# Patient Record
Sex: MALE | Race: WHITE | NOT HISPANIC OR LATINO | Employment: UNEMPLOYED | URBAN - METROPOLITAN AREA
[De-identification: names, ages, dates, MRNs, and addresses within clinical notes are randomized per-mention and may not be internally consistent; named-entity substitution may affect disease eponyms.]

---

## 2017-02-15 ENCOUNTER — GENERIC CONVERSION - ENCOUNTER (OUTPATIENT)
Dept: OTHER | Facility: OTHER | Age: 57
End: 2017-02-15

## 2017-08-19 ENCOUNTER — APPOINTMENT (EMERGENCY)
Dept: RADIOLOGY | Facility: HOSPITAL | Age: 57
End: 2017-08-19
Payer: MEDICARE

## 2017-08-19 ENCOUNTER — HOSPITAL ENCOUNTER (EMERGENCY)
Facility: HOSPITAL | Age: 57
Discharge: ANOTHER HOSPITAL | End: 2017-08-21
Attending: EMERGENCY MEDICINE | Admitting: EMERGENCY MEDICINE
Payer: MEDICARE

## 2017-08-19 DIAGNOSIS — R45.89 DEPRESSED MOOD: Primary | ICD-10-CM

## 2017-08-19 DIAGNOSIS — R45.851 SUICIDE IDEATION: ICD-10-CM

## 2017-08-19 DIAGNOSIS — Z72.89 ALCOHOL USE: ICD-10-CM

## 2017-08-19 LAB
ANION GAP SERPL CALCULATED.3IONS-SCNC: 13 MMOL/L (ref 4–13)
APAP SERPL-MCNC: <2 UG/ML (ref 10–30)
BASOPHILS # BLD AUTO: 0 THOUSANDS/ΜL (ref 0–0.1)
BASOPHILS NFR BLD AUTO: 1 % (ref 0–1)
BUN SERPL-MCNC: 8 MG/DL (ref 5–25)
CALCIUM SERPL-MCNC: 9.4 MG/DL (ref 8.3–10.1)
CHLORIDE SERPL-SCNC: 103 MMOL/L (ref 100–108)
CO2 SERPL-SCNC: 24 MMOL/L (ref 21–32)
CREAT SERPL-MCNC: 0.68 MG/DL (ref 0.6–1.3)
EOSINOPHIL # BLD AUTO: 0 THOUSAND/ΜL (ref 0–0.61)
EOSINOPHIL NFR BLD AUTO: 1 % (ref 0–6)
ERYTHROCYTE [DISTWIDTH] IN BLOOD BY AUTOMATED COUNT: 13.9 % (ref 11.6–15.1)
ETHANOL SERPL-MCNC: 104 MG/DL (ref 0–3)
ETHANOL SERPL-MCNC: 176 MG/DL (ref 0–3)
GFR SERPL CREATININE-BSD FRML MDRD: 106 ML/MIN/1.73SQ M
GLUCOSE SERPL-MCNC: 94 MG/DL (ref 65–140)
HCT VFR BLD AUTO: 47 % (ref 42–52)
HGB BLD-MCNC: 16.3 G/DL (ref 14–18)
LYMPHOCYTES # BLD AUTO: 1 THOUSANDS/ΜL (ref 0.6–4.47)
LYMPHOCYTES NFR BLD AUTO: 18 % (ref 14–44)
MCH RBC QN AUTO: 33.2 PG (ref 27–31)
MCHC RBC AUTO-ENTMCNC: 34.7 G/DL (ref 31.4–37.4)
MCV RBC AUTO: 96 FL (ref 82–98)
MONOCYTES # BLD AUTO: 0.6 THOUSAND/ΜL (ref 0.17–1.22)
MONOCYTES NFR BLD AUTO: 12 % (ref 4–12)
NEUTROPHILS # BLD AUTO: 3.5 THOUSANDS/ΜL (ref 1.85–7.62)
NEUTS SEG NFR BLD AUTO: 68 % (ref 43–75)
NRBC BLD AUTO-RTO: 0 /100 WBCS
PLATELET # BLD AUTO: 211 THOUSANDS/UL (ref 130–400)
PMV BLD AUTO: 7 FL (ref 8.9–12.7)
POTASSIUM SERPL-SCNC: 3.7 MMOL/L (ref 3.5–5.3)
RBC # BLD AUTO: 4.92 MILLION/UL (ref 4.7–6.1)
SALICYLATES SERPL-MCNC: 3.9 MG/DL (ref 3–20)
SODIUM SERPL-SCNC: 140 MMOL/L (ref 136–145)
WBC # BLD AUTO: 5.2 THOUSAND/UL (ref 4.8–10.8)

## 2017-08-19 PROCEDURE — 80048 BASIC METABOLIC PNL TOTAL CA: CPT | Performed by: EMERGENCY MEDICINE

## 2017-08-19 PROCEDURE — 93005 ELECTROCARDIOGRAM TRACING: CPT | Performed by: EMERGENCY MEDICINE

## 2017-08-19 PROCEDURE — 80320 DRUG SCREEN QUANTALCOHOLS: CPT | Performed by: EMERGENCY MEDICINE

## 2017-08-19 PROCEDURE — 80329 ANALGESICS NON-OPIOID 1 OR 2: CPT | Performed by: EMERGENCY MEDICINE

## 2017-08-19 PROCEDURE — 85025 COMPLETE CBC W/AUTO DIFF WBC: CPT | Performed by: EMERGENCY MEDICINE

## 2017-08-19 PROCEDURE — 36415 COLL VENOUS BLD VENIPUNCTURE: CPT | Performed by: EMERGENCY MEDICINE

## 2017-08-19 PROCEDURE — 70486 CT MAXILLOFACIAL W/O DYE: CPT

## 2017-08-19 PROCEDURE — 70450 CT HEAD/BRAIN W/O DYE: CPT

## 2017-08-19 PROCEDURE — 90715 TDAP VACCINE 7 YRS/> IM: CPT | Performed by: EMERGENCY MEDICINE

## 2017-08-19 RX ORDER — CEPHALEXIN 500 MG/1
500 CAPSULE ORAL ONCE
Status: COMPLETED | OUTPATIENT
Start: 2017-08-19 | End: 2017-08-19

## 2017-08-19 RX ORDER — LIDOCAINE HYDROCHLORIDE 10 MG/ML
5 INJECTION, SOLUTION EPIDURAL; INFILTRATION; INTRACAUDAL; PERINEURAL ONCE
Status: COMPLETED | OUTPATIENT
Start: 2017-08-19 | End: 2017-08-19

## 2017-08-19 RX ADMIN — LIDOCAINE HYDROCHLORIDE 5 ML: 10 INJECTION, SOLUTION EPIDURAL; INFILTRATION; INTRACAUDAL; PERINEURAL at 17:55

## 2017-08-19 RX ADMIN — TETANUS TOXOID, REDUCED DIPHTHERIA TOXOID AND ACELLULAR PERTUSSIS VACCINE, ADSORBED 0.5 ML: 5; 2.5; 8; 8; 2.5 SUSPENSION INTRAMUSCULAR at 17:54

## 2017-08-19 RX ADMIN — CEPHALEXIN 500 MG: 500 CAPSULE ORAL at 19:36

## 2017-08-20 LAB
AMPHETAMINES SERPL QL SCN: NEGATIVE
BARBITURATES UR QL: NEGATIVE
BENZODIAZ UR QL: NEGATIVE
BILIRUB UR QL STRIP: NEGATIVE
CLARITY UR: CLEAR
COCAINE UR QL: POSITIVE
COLOR UR: YELLOW
GLUCOSE UR STRIP-MCNC: NEGATIVE MG/DL
HGB UR QL STRIP.AUTO: NEGATIVE
KETONES UR STRIP-MCNC: NEGATIVE MG/DL
LEUKOCYTE ESTERASE UR QL STRIP: NEGATIVE
METHADONE UR QL: NEGATIVE
NITRITE UR QL STRIP: NEGATIVE
OPIATES UR QL SCN: NEGATIVE
PCP UR QL: NEGATIVE
PH UR STRIP.AUTO: 7 [PH] (ref 5–9)
PROT UR STRIP-MCNC: NEGATIVE MG/DL
SP GR UR STRIP.AUTO: <=1.005 (ref 1–1.03)
THC UR QL: NEGATIVE
UROBILINOGEN UR QL STRIP.AUTO: 0.2 E.U./DL

## 2017-08-20 PROCEDURE — 80307 DRUG TEST PRSMV CHEM ANLYZR: CPT | Performed by: EMERGENCY MEDICINE

## 2017-08-20 PROCEDURE — 81003 URINALYSIS AUTO W/O SCOPE: CPT | Performed by: EMERGENCY MEDICINE

## 2017-08-20 RX ORDER — NICOTINE 21 MG/24HR
21 PATCH, TRANSDERMAL 24 HOURS TRANSDERMAL DAILY
Status: DISCONTINUED | OUTPATIENT
Start: 2017-08-20 | End: 2017-08-21 | Stop reason: HOSPADM

## 2017-08-20 RX ORDER — NICOTINE 21 MG/24HR
PATCH, TRANSDERMAL 24 HOURS TRANSDERMAL
Status: DISPENSED
Start: 2017-08-20 | End: 2017-08-21

## 2017-08-20 RX ORDER — CEPHALEXIN 500 MG/1
500 CAPSULE ORAL 2 TIMES DAILY
Status: DISCONTINUED | OUTPATIENT
Start: 2017-08-20 | End: 2017-08-21 | Stop reason: HOSPADM

## 2017-08-20 RX ADMIN — CEPHALEXIN 500 MG: 500 CAPSULE ORAL at 19:17

## 2017-08-20 RX ADMIN — NICOTINE 21 MG: 21 PATCH, EXTENDED RELEASE TRANSDERMAL at 20:14

## 2017-08-21 VITALS
DIASTOLIC BLOOD PRESSURE: 77 MMHG | WEIGHT: 130 LBS | RESPIRATION RATE: 18 BRPM | HEIGHT: 64 IN | BODY MASS INDEX: 22.2 KG/M2 | SYSTOLIC BLOOD PRESSURE: 139 MMHG | HEART RATE: 69 BPM | OXYGEN SATURATION: 97 % | TEMPERATURE: 97 F

## 2017-08-21 LAB
ATRIAL RATE: 78 BPM
P AXIS: 67 DEGREES
PR INTERVAL: 138 MS
QRS AXIS: 76 DEGREES
QRSD INTERVAL: 98 MS
QT INTERVAL: 396 MS
QTC INTERVAL: 451 MS
T WAVE AXIS: 69 DEGREES
VENTRICULAR RATE: 78 BPM

## 2017-08-21 PROCEDURE — 99285 EMERGENCY DEPT VISIT HI MDM: CPT

## 2017-08-21 PROCEDURE — 90471 IMMUNIZATION ADMIN: CPT

## 2017-08-21 RX ADMIN — CEPHALEXIN 500 MG: 500 CAPSULE ORAL at 09:46

## 2017-08-21 RX ADMIN — NICOTINE 21 MG: 21 PATCH, EXTENDED RELEASE TRANSDERMAL at 09:52

## 2017-08-21 RX ADMIN — CEPHALEXIN 500 MG: 500 CAPSULE ORAL at 20:07

## 2017-08-28 ENCOUNTER — GENERIC CONVERSION - ENCOUNTER (OUTPATIENT)
Dept: OTHER | Facility: OTHER | Age: 57
End: 2017-08-28

## 2017-08-29 ENCOUNTER — ALLSCRIPTS OFFICE VISIT (OUTPATIENT)
Dept: PSYCHOLOGY | Facility: CLINIC | Age: 57
End: 2017-08-29
Payer: MEDICARE

## 2017-08-29 ENCOUNTER — GENERIC CONVERSION - ENCOUNTER (OUTPATIENT)
Dept: OTHER | Facility: OTHER | Age: 57
End: 2017-08-29

## 2017-08-29 PROCEDURE — G0177 OPPS/PHP; TRAIN & EDUC SERV: HCPCS | Performed by: PSYCHIATRY & NEUROLOGY

## 2017-08-29 PROCEDURE — 90791 PSYCH DIAGNOSTIC EVALUATION: CPT | Performed by: PSYCHIATRY & NEUROLOGY

## 2017-08-29 PROCEDURE — G0176 OPPS/PHP;ACTIVITY THERAPY: HCPCS | Performed by: PSYCHIATRY & NEUROLOGY

## 2017-08-29 PROCEDURE — G0410 GRP PSYCH PARTIAL HOSP 45-50: HCPCS | Performed by: PSYCHIATRY & NEUROLOGY

## 2017-08-30 ENCOUNTER — APPOINTMENT (OUTPATIENT)
Dept: PSYCHOLOGY | Facility: CLINIC | Age: 57
End: 2017-08-30
Payer: MEDICARE

## 2017-08-30 ENCOUNTER — GENERIC CONVERSION - ENCOUNTER (OUTPATIENT)
Dept: OTHER | Facility: OTHER | Age: 57
End: 2017-08-30

## 2017-08-30 PROCEDURE — G0177 OPPS/PHP; TRAIN & EDUC SERV: HCPCS | Performed by: PSYCHIATRY & NEUROLOGY

## 2017-08-30 PROCEDURE — G0176 OPPS/PHP;ACTIVITY THERAPY: HCPCS | Performed by: PSYCHIATRY & NEUROLOGY

## 2017-08-30 PROCEDURE — G0410 GRP PSYCH PARTIAL HOSP 45-50: HCPCS | Performed by: PSYCHIATRY & NEUROLOGY

## 2017-08-31 ENCOUNTER — APPOINTMENT (OUTPATIENT)
Dept: PSYCHOLOGY | Facility: CLINIC | Age: 57
End: 2017-08-31
Payer: MEDICARE

## 2017-08-31 ENCOUNTER — GENERIC CONVERSION - ENCOUNTER (OUTPATIENT)
Dept: OTHER | Facility: OTHER | Age: 57
End: 2017-08-31

## 2017-08-31 PROCEDURE — G0177 OPPS/PHP; TRAIN & EDUC SERV: HCPCS | Performed by: PSYCHIATRY & NEUROLOGY

## 2017-08-31 PROCEDURE — G0176 OPPS/PHP;ACTIVITY THERAPY: HCPCS | Performed by: PSYCHIATRY & NEUROLOGY

## 2017-08-31 PROCEDURE — G0410 GRP PSYCH PARTIAL HOSP 45-50: HCPCS | Performed by: PSYCHIATRY & NEUROLOGY

## 2017-09-01 ENCOUNTER — APPOINTMENT (OUTPATIENT)
Dept: PSYCHOLOGY | Facility: CLINIC | Age: 57
End: 2017-09-01
Payer: MEDICARE

## 2017-09-01 ENCOUNTER — GENERIC CONVERSION - ENCOUNTER (OUTPATIENT)
Dept: OTHER | Facility: OTHER | Age: 57
End: 2017-09-01

## 2017-09-01 PROCEDURE — G0176 OPPS/PHP;ACTIVITY THERAPY: HCPCS | Performed by: PSYCHIATRY & NEUROLOGY

## 2017-09-01 PROCEDURE — G0177 OPPS/PHP; TRAIN & EDUC SERV: HCPCS | Performed by: PSYCHIATRY & NEUROLOGY

## 2017-09-01 PROCEDURE — G0410 GRP PSYCH PARTIAL HOSP 45-50: HCPCS | Performed by: PSYCHIATRY & NEUROLOGY

## 2017-09-05 ENCOUNTER — GENERIC CONVERSION - ENCOUNTER (OUTPATIENT)
Dept: OTHER | Facility: OTHER | Age: 57
End: 2017-09-05

## 2017-09-05 ENCOUNTER — APPOINTMENT (OUTPATIENT)
Dept: PSYCHOLOGY | Facility: CLINIC | Age: 57
End: 2017-09-05
Payer: MEDICARE

## 2017-09-05 PROCEDURE — G0176 OPPS/PHP;ACTIVITY THERAPY: HCPCS | Performed by: PSYCHIATRY & NEUROLOGY

## 2017-09-05 PROCEDURE — G0177 OPPS/PHP; TRAIN & EDUC SERV: HCPCS | Performed by: PSYCHIATRY & NEUROLOGY

## 2017-09-05 PROCEDURE — G0410 GRP PSYCH PARTIAL HOSP 45-50: HCPCS | Performed by: PSYCHIATRY & NEUROLOGY

## 2017-09-06 ENCOUNTER — APPOINTMENT (OUTPATIENT)
Dept: PSYCHOLOGY | Facility: CLINIC | Age: 57
End: 2017-09-06
Payer: MEDICARE

## 2017-09-06 ENCOUNTER — GENERIC CONVERSION - ENCOUNTER (OUTPATIENT)
Dept: OTHER | Facility: OTHER | Age: 57
End: 2017-09-06

## 2017-09-06 PROCEDURE — G0177 OPPS/PHP; TRAIN & EDUC SERV: HCPCS | Performed by: PSYCHIATRY & NEUROLOGY

## 2017-09-06 PROCEDURE — G0410 GRP PSYCH PARTIAL HOSP 45-50: HCPCS | Performed by: PSYCHIATRY & NEUROLOGY

## 2017-09-06 PROCEDURE — G0176 OPPS/PHP;ACTIVITY THERAPY: HCPCS | Performed by: PSYCHIATRY & NEUROLOGY

## 2017-09-07 ENCOUNTER — GENERIC CONVERSION - ENCOUNTER (OUTPATIENT)
Dept: OTHER | Facility: OTHER | Age: 57
End: 2017-09-07

## 2017-09-07 ENCOUNTER — APPOINTMENT (OUTPATIENT)
Dept: PSYCHOLOGY | Facility: CLINIC | Age: 57
End: 2017-09-07
Payer: MEDICARE

## 2017-09-07 PROCEDURE — G0410 GRP PSYCH PARTIAL HOSP 45-50: HCPCS | Performed by: PSYCHIATRY & NEUROLOGY

## 2017-09-07 PROCEDURE — G0176 OPPS/PHP;ACTIVITY THERAPY: HCPCS | Performed by: PSYCHIATRY & NEUROLOGY

## 2017-09-07 PROCEDURE — G0177 OPPS/PHP; TRAIN & EDUC SERV: HCPCS | Performed by: PSYCHIATRY & NEUROLOGY

## 2017-09-08 ENCOUNTER — GENERIC CONVERSION - ENCOUNTER (OUTPATIENT)
Dept: OTHER | Facility: OTHER | Age: 57
End: 2017-09-08

## 2017-09-11 ENCOUNTER — GENERIC CONVERSION - ENCOUNTER (OUTPATIENT)
Dept: OTHER | Facility: OTHER | Age: 57
End: 2017-09-11

## 2017-09-12 ENCOUNTER — GENERIC CONVERSION - ENCOUNTER (OUTPATIENT)
Dept: OTHER | Facility: OTHER | Age: 57
End: 2017-09-12

## 2017-09-12 ENCOUNTER — APPOINTMENT (OUTPATIENT)
Dept: PSYCHOLOGY | Facility: CLINIC | Age: 57
End: 2017-09-12
Payer: MEDICARE

## 2017-09-12 PROCEDURE — G0410 GRP PSYCH PARTIAL HOSP 45-50: HCPCS | Performed by: PSYCHIATRY & NEUROLOGY

## 2017-09-12 PROCEDURE — G0176 OPPS/PHP;ACTIVITY THERAPY: HCPCS | Performed by: PSYCHIATRY & NEUROLOGY

## 2017-09-12 PROCEDURE — G0177 OPPS/PHP; TRAIN & EDUC SERV: HCPCS | Performed by: PSYCHIATRY & NEUROLOGY

## 2017-09-13 ENCOUNTER — GENERIC CONVERSION - ENCOUNTER (OUTPATIENT)
Dept: OTHER | Facility: OTHER | Age: 57
End: 2017-09-13

## 2017-09-13 ENCOUNTER — APPOINTMENT (OUTPATIENT)
Dept: PSYCHOLOGY | Facility: CLINIC | Age: 57
End: 2017-09-13
Payer: MEDICARE

## 2017-09-13 PROCEDURE — G0177 OPPS/PHP; TRAIN & EDUC SERV: HCPCS | Performed by: PSYCHIATRY & NEUROLOGY

## 2017-09-13 PROCEDURE — G0410 GRP PSYCH PARTIAL HOSP 45-50: HCPCS | Performed by: PSYCHIATRY & NEUROLOGY

## 2017-09-13 PROCEDURE — G0176 OPPS/PHP;ACTIVITY THERAPY: HCPCS | Performed by: PSYCHIATRY & NEUROLOGY

## 2017-09-14 ENCOUNTER — GENERIC CONVERSION - ENCOUNTER (OUTPATIENT)
Dept: OTHER | Facility: OTHER | Age: 57
End: 2017-09-14

## 2017-09-14 ENCOUNTER — APPOINTMENT (OUTPATIENT)
Dept: PSYCHOLOGY | Facility: CLINIC | Age: 57
End: 2017-09-14
Payer: MEDICARE

## 2017-09-14 PROCEDURE — G0176 OPPS/PHP;ACTIVITY THERAPY: HCPCS | Performed by: PSYCHIATRY & NEUROLOGY

## 2017-09-14 PROCEDURE — G0177 OPPS/PHP; TRAIN & EDUC SERV: HCPCS | Performed by: PSYCHIATRY & NEUROLOGY

## 2017-09-14 PROCEDURE — G0410 GRP PSYCH PARTIAL HOSP 45-50: HCPCS | Performed by: PSYCHIATRY & NEUROLOGY

## 2017-09-15 ENCOUNTER — GENERIC CONVERSION - ENCOUNTER (OUTPATIENT)
Dept: OTHER | Facility: OTHER | Age: 57
End: 2017-09-15

## 2017-09-15 ENCOUNTER — APPOINTMENT (OUTPATIENT)
Dept: PSYCHOLOGY | Facility: CLINIC | Age: 57
End: 2017-09-15
Payer: MEDICARE

## 2017-09-15 PROCEDURE — G0410 GRP PSYCH PARTIAL HOSP 45-50: HCPCS | Performed by: PSYCHIATRY & NEUROLOGY

## 2017-09-15 PROCEDURE — G0177 OPPS/PHP; TRAIN & EDUC SERV: HCPCS | Performed by: PSYCHIATRY & NEUROLOGY

## 2017-09-15 PROCEDURE — G0176 OPPS/PHP;ACTIVITY THERAPY: HCPCS | Performed by: PSYCHIATRY & NEUROLOGY

## 2017-09-18 ENCOUNTER — APPOINTMENT (OUTPATIENT)
Dept: PSYCHOLOGY | Facility: CLINIC | Age: 57
End: 2017-09-18
Payer: MEDICARE

## 2017-09-18 ENCOUNTER — GENERIC CONVERSION - ENCOUNTER (OUTPATIENT)
Dept: OTHER | Facility: OTHER | Age: 57
End: 2017-09-18

## 2017-09-18 PROCEDURE — G0177 OPPS/PHP; TRAIN & EDUC SERV: HCPCS | Performed by: PSYCHIATRY & NEUROLOGY

## 2017-09-18 PROCEDURE — G0176 OPPS/PHP;ACTIVITY THERAPY: HCPCS | Performed by: PSYCHIATRY & NEUROLOGY

## 2017-09-18 PROCEDURE — G0410 GRP PSYCH PARTIAL HOSP 45-50: HCPCS | Performed by: PSYCHIATRY & NEUROLOGY

## 2017-09-19 ENCOUNTER — APPOINTMENT (OUTPATIENT)
Dept: PSYCHOLOGY | Facility: CLINIC | Age: 57
End: 2017-09-19
Payer: MEDICARE

## 2017-09-19 ENCOUNTER — GENERIC CONVERSION - ENCOUNTER (OUTPATIENT)
Dept: OTHER | Facility: OTHER | Age: 57
End: 2017-09-19

## 2017-09-19 DIAGNOSIS — Z79.899 OTHER LONG TERM (CURRENT) DRUG THERAPY: ICD-10-CM

## 2017-09-19 PROCEDURE — G0177 OPPS/PHP; TRAIN & EDUC SERV: HCPCS | Performed by: PSYCHIATRY & NEUROLOGY

## 2017-09-19 PROCEDURE — G0176 OPPS/PHP;ACTIVITY THERAPY: HCPCS | Performed by: PSYCHIATRY & NEUROLOGY

## 2017-09-19 PROCEDURE — G0410 GRP PSYCH PARTIAL HOSP 45-50: HCPCS | Performed by: PSYCHIATRY & NEUROLOGY

## 2017-09-20 ENCOUNTER — GENERIC CONVERSION - ENCOUNTER (OUTPATIENT)
Dept: OTHER | Facility: OTHER | Age: 57
End: 2017-09-20

## 2017-09-21 ENCOUNTER — GENERIC CONVERSION - ENCOUNTER (OUTPATIENT)
Dept: OTHER | Facility: OTHER | Age: 57
End: 2017-09-21

## 2017-09-21 ENCOUNTER — APPOINTMENT (OUTPATIENT)
Dept: LAB | Facility: CLINIC | Age: 57
End: 2017-09-21
Payer: MEDICARE

## 2017-09-21 ENCOUNTER — APPOINTMENT (OUTPATIENT)
Dept: PSYCHOLOGY | Facility: CLINIC | Age: 57
End: 2017-09-21
Payer: MEDICARE

## 2017-09-21 DIAGNOSIS — Z79.899 OTHER LONG TERM (CURRENT) DRUG THERAPY: ICD-10-CM

## 2017-09-21 LAB
ALBUMIN SERPL BCP-MCNC: 3.2 G/DL (ref 3.5–5)
ALP SERPL-CCNC: 85 U/L (ref 46–116)
ALT SERPL W P-5'-P-CCNC: 23 U/L (ref 12–78)
ANION GAP SERPL CALCULATED.3IONS-SCNC: 6 MMOL/L (ref 4–13)
AST SERPL W P-5'-P-CCNC: 16 U/L (ref 5–45)
BASOPHILS # BLD AUTO: 0.03 THOUSANDS/ΜL (ref 0–0.1)
BASOPHILS NFR BLD AUTO: 1 % (ref 0–1)
BILIRUB SERPL-MCNC: 0.46 MG/DL (ref 0.2–1)
BUN SERPL-MCNC: 17 MG/DL (ref 5–25)
CALCIUM SERPL-MCNC: 8.5 MG/DL (ref 8.3–10.1)
CHLORIDE SERPL-SCNC: 108 MMOL/L (ref 100–108)
CO2 SERPL-SCNC: 25 MMOL/L (ref 21–32)
CREAT SERPL-MCNC: 0.68 MG/DL (ref 0.6–1.3)
EOSINOPHIL # BLD AUTO: 0.13 THOUSAND/ΜL (ref 0–0.61)
EOSINOPHIL NFR BLD AUTO: 2 % (ref 0–6)
ERYTHROCYTE [DISTWIDTH] IN BLOOD BY AUTOMATED COUNT: 13.4 % (ref 11.6–15.1)
GFR SERPL CREATININE-BSD FRML MDRD: 106 ML/MIN/1.73SQ M
GLUCOSE P FAST SERPL-MCNC: 104 MG/DL (ref 65–99)
HCT VFR BLD AUTO: 45.1 % (ref 36.5–49.3)
HGB BLD-MCNC: 15.8 G/DL (ref 12–17)
LYMPHOCYTES # BLD AUTO: 0.97 THOUSANDS/ΜL (ref 0.6–4.47)
LYMPHOCYTES NFR BLD AUTO: 17 % (ref 14–44)
MCH RBC QN AUTO: 33 PG (ref 26.8–34.3)
MCHC RBC AUTO-ENTMCNC: 35 G/DL (ref 31.4–37.4)
MCV RBC AUTO: 94 FL (ref 82–98)
MONOCYTES # BLD AUTO: 0.86 THOUSAND/ΜL (ref 0.17–1.22)
MONOCYTES NFR BLD AUTO: 15 % (ref 4–12)
NEUTROPHILS # BLD AUTO: 3.58 THOUSANDS/ΜL (ref 1.85–7.62)
NEUTS SEG NFR BLD AUTO: 65 % (ref 43–75)
NRBC BLD AUTO-RTO: 0 /100 WBCS
PLATELET # BLD AUTO: 160 THOUSANDS/UL (ref 149–390)
PMV BLD AUTO: 9.9 FL (ref 8.9–12.7)
POTASSIUM SERPL-SCNC: 4.1 MMOL/L (ref 3.5–5.3)
PROT SERPL-MCNC: 6.6 G/DL (ref 6.4–8.2)
RBC # BLD AUTO: 4.79 MILLION/UL (ref 3.88–5.62)
SODIUM SERPL-SCNC: 139 MMOL/L (ref 136–145)
VALPROATE SERPL-MCNC: 71 UG/ML (ref 50–100)
WBC # BLD AUTO: 5.58 THOUSAND/UL (ref 4.31–10.16)

## 2017-09-21 PROCEDURE — 36415 COLL VENOUS BLD VENIPUNCTURE: CPT

## 2017-09-21 PROCEDURE — G0177 OPPS/PHP; TRAIN & EDUC SERV: HCPCS | Performed by: PSYCHIATRY & NEUROLOGY

## 2017-09-21 PROCEDURE — 85025 COMPLETE CBC W/AUTO DIFF WBC: CPT

## 2017-09-21 PROCEDURE — 80164 ASSAY DIPROPYLACETIC ACD TOT: CPT

## 2017-09-21 PROCEDURE — G0176 OPPS/PHP;ACTIVITY THERAPY: HCPCS | Performed by: PSYCHIATRY & NEUROLOGY

## 2017-09-21 PROCEDURE — G0410 GRP PSYCH PARTIAL HOSP 45-50: HCPCS | Performed by: PSYCHIATRY & NEUROLOGY

## 2017-09-21 PROCEDURE — 80053 COMPREHEN METABOLIC PANEL: CPT

## 2017-09-22 ENCOUNTER — GENERIC CONVERSION - ENCOUNTER (OUTPATIENT)
Dept: OTHER | Facility: OTHER | Age: 57
End: 2017-09-22

## 2017-09-22 ENCOUNTER — APPOINTMENT (OUTPATIENT)
Dept: PSYCHOLOGY | Facility: CLINIC | Age: 57
End: 2017-09-22
Payer: MEDICARE

## 2017-09-22 PROCEDURE — G0176 OPPS/PHP;ACTIVITY THERAPY: HCPCS | Performed by: PSYCHIATRY & NEUROLOGY

## 2017-09-22 PROCEDURE — G0410 GRP PSYCH PARTIAL HOSP 45-50: HCPCS | Performed by: PSYCHIATRY & NEUROLOGY

## 2017-09-22 PROCEDURE — G0177 OPPS/PHP; TRAIN & EDUC SERV: HCPCS | Performed by: PSYCHIATRY & NEUROLOGY

## 2017-11-02 ENCOUNTER — GENERIC CONVERSION - ENCOUNTER (OUTPATIENT)
Dept: OTHER | Facility: OTHER | Age: 57
End: 2017-11-02
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
## 2018-01-10 NOTE — PSYCH
History of Present Illness  [facility] Clinical Progress Note [facility]:   Specialized Services Documentation - Therapist must complete separate progress note for each specific clinical activity in which the client participated during the day  (129) Group Psychotherapy: [id number] Kendall Sever participated in wellness group focused on identifying one's strengths and understanding how they can be applied to improving wellness  Kendall Sever shared that one important strength he possesses is that he is organized and that "everything has a place and everything needs to go to its place " Kendall Sever stated that he also handles his medications in this way and therefore he has no trouble taking them as they are ordered on a regular routine  He stated that he is looking for a new residence and he discussed with peers how he can apply his strength of organizing and time management to his search for a new residence  Kendall Sever made mild progress toward goals  Continue group to provide education and practice in identifying one's own strengths and ways to apply these strengths to improving symptoms of mental illness and better wellness  Treatment Plan Problem(s): 1 1,1 2,1 3  Natalie Arshad RN     (978) Group Psychotherapy: (12:30-1:30pm)   Kendall Sever participated in group  The group topics were 15 styles of distorted thinking and 12 rules to ruin your day  The group reviewed the handouts then discussed which of the distorted styles of thinking do they have, where does having distorted thinking lead to and coping skills  He was able to identify his distorted thinking styles  He participated only when asked to do so  Min progress on goal  Continue with group  Treatment Plan Problem(s): 1 2  Susanne Kilpatrick LCSW       (165) Education Therapy Goals set - Speak at [facility] Problem(s): 1 1, 1 5  Education Therapy Time - 0900 - 0930 Previous goal was met  Readiness to Learning:  He is receptive to learning     There are  no barriers to learning  Learning Assessment Time - 1330 - 1400   Education completed on  illness and wellness tools  The teaching method was  verbal and demonstration  JESSENIA Alvarez MT-BC     (848) Allied Therapy [phone] Huber Antoine moderately shared in [facility] group focused on assertive behavior and DBT module of Interpersonal Effectiveness  He partially engaged in task of instrument playing and discussion  Group explored the differences between assertive, passive, passive aggressive, and aggressive  He received hand-outs on goals in interpersonal effectiveness and challenging myths  Hubercoral Antoine engaged in instrument playing but remained quiet during discussion  Some small progress towards goal noted  Continue AT to further encourage the use of wellness tool to promote recovery  JESSENIA Alvarez  Treatment Plan Problem(s): 1 1, 1 2  TONY Tanner       Case Management Note:   15:45-call to [facility], left message for Cindi Parsons,  ([phone]) for call back re: intake into [facility] office  14:57-left message for Levy Nicholson single occupancy  for CORBIN Waller, requesting call back re: program availability and requirements  TREATMENT SESSION NUMBER: 10   Current suicide risk is low  Medications not changed/added/denied  Soma Water MSW, LSW      Active Problems    1  Alcohol abuse (305 00) (F10 10)   2  Bipolar disorder, current episode depressed, severe, without psychotic features (296 53)   (F31 4)   3  Chronic obstructive pulmonary disease (496) (J44 9)   4  Complete tear of left rotator cuff (727 61) (M75 122)   5  Complete tear of right rotator cuff (727 61) (M75 121)   6  Hypercholesterolemia (272 0) (E78 00)   7  Hypothyroidism (244 9) (E03 9)   8  Malignant neoplasm without specification of site (199 1) (C80 1)   9  Rotator cuff tendinitis, unspecified laterality (726 10) (M75 80)   10   Shoulder tendonitis, right (786 94) (A89 62)    Past Medical History    1  Denied: History of seizure   2  Denied: History of traumatic injury of head    Allergies    1  No Known Drug Allergies    Current Meds   1  HydrOXYzine Pamoate 50 MG Oral Capsule; TAKE 1 CAPSULE Every 4 hours PRN; Therapy: (Recorded:10Jba2956) to Recorded   2  Levothyroxine Sodium 25 MCG Oral Tablet; TAKE 1 TABLET DAILY; Therapy: (Ermelinda Macedo) to Recorded   3  Mirtazapine 15 MG Oral Tablet; TAKE 1 TABLET Bedtime; Therapy: (Ermelinda Macedo) to Recorded   4  Omega 3 1000 MG Oral Capsule; Take 1 capsule twice daily; Therapy: [id number] to (Evaluate:28Sep2017)  Requested for: 90Txm5581; Last   Rx:22Cud4950 Ordered   5  Valproate Sodium 250 MG/5ML Oral Solution; TAKE 10ML TWICE DAILY; Therapy: 07Sep2017 to (Last Rx:07Sep2017) Ordered   6  Wellbutrin 75 MG TABS; Take 1 tablet daily; Therapy: (Recorded:50Jqh0345) to Recorded    Family Psych History  Mother    1  Family history of Alcohol abuse   2  Family history of depression (V17 0) (Z81 8)  Father    3  Family history of Alcohol abuse   4  Family history of depression (V17 0) (Z81 8)  Sister    5  Family history of Alcohol abuse  Family History    6   Family history of No Significant Family History    Social History    · Alcohol abuse (305 00) (F10 10)   · Completed 8th grade   · Current Every Day Smoker (305 1)   · Daily Coffee Consumption (___ Cups/Day)   · Disabled   ·     Future Appointments    Date/Time Provider Specialty Site   09/15/2017 10:00 AM Matilda Sherman DO Psychiatry [facility]     Signatures   Electronically signed by : Geovanny Hernandez RN; Sep 14 2017  2:07PM EST                       (Author)    Electronically signed by : Irish Acharya LCSW; Sep 14 2017  2:09PM EST                       (Author)    Electronically signed by : Irish Acharya LCSW; Sep 14 2017  2:13PM EST                       (Author)    Electronically signed by : JESSENIA Chaparro; Sep 14 2017  2:15PM EST                       (Author)    Electronically signed by : TONY Hernandes; Sep 14 2017  2:31PM EST                       (Author)    Electronically signed by : KIRSTEN Guthrie; Sep 14 2017  3:59PM EST                       (Author)

## 2018-01-10 NOTE — PSYCH
History of Present Illness  [facility] Clinical Progress Note [facility]:   Specialized Services Documentation - Therapist must complete separate progress note for each specific clinical activity in which the client participated during the day  (821) Group Psychotherapy: [id number] Katie Aguirre participated in weekly wellness group to discuss what particular area of wellness that has been worked on up to today in Program and choice of area to continue working on for recovery as targeted in treatment plan, by choice or in WRAP  Katie Aguirre shared that he is very worried about his truck and it is also making him angry that 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  He stated that he is trying to handle it in the best way possible but 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  He described trying to problem solve without escalating the situation into a "bigger problem " [place] offered support to Katie Aguirre and he received their support  Katie Aguirre made mild progress toward goals  Continue to offer weekly wellness as an strategy to offer opportunity to focus on goals and identify areas of goal achievement and need  Treatment Plan Problem(s): 1 1,1 2  Ishmael Landau, RN     (167) Group Psychotherapy: (12:30-1:30pm) Co-occurring group   Katie Aguirre participated in group  Topics discussed in group were the definition of addiction and what AA has to offer  Ct shared he lives he lives in a house were there is drugs and alcohol  His coping skills are leave in the early morning, go to [facility] and he is looking for another place to live  Mild progress on goal  Continue with group  Treatment Plan Problem(s): 2 1  Hall Rinne, LCSW       (902) Education Therapy Goals set - Go to AA meeting and figure out insurance    Treatment Plan Problem(s): 1 1  Education Therapy Time - 0900 - 0930 Previous goal was met  Readiness to Learning:  He is receptive to learning     There are  no barriers to learning  Learning Assessment Time - 1330 - 1400   Education completed on  illness and wellness tools  The teaching method was  verbal and demonstration  Shared area of learning: Yes  JESSENIA Welch MT-BC     (225) Allied Therapy [phone] Caye Crigler actively shared in [facility] group focused on healthy weekend choices  He actively engaged in task of question answering  Group explored accumulating the positives, building mastery, and coping ahead  Caye Crigler was given hand-outs on setting priorities and log of a weekend schedule  Caye Crigler shared throughout the group, and said âAn accomplishment for me this year was stopping drinking  â Beginning progress towards goal observed and shared  Continue AT to further encourage the use of wellness tool to promote recovery  JESSENIA Welch  Treatment Plan Problem(s): 1 1, 1 4, 2 1  TONY Grove       Case Management Note: Individual Case Management visit not provided today  TREATMENT SESSION NUMBER: 4   Current suicide risk is low  Medications not changed/added/denied  Jacqueline Zacarias MSW, LSW      Active Problems    1  Alcohol abuse (305 00) (F10 10)   2  Bipolar disorder, current episode depressed, severe, without psychotic features (296 53)   (F31 4)   3  Chronic obstructive pulmonary disease (496) (J44 9)   4  Complete tear of left rotator cuff (727 61) (M75 122)   5  Complete tear of right rotator cuff (727 61) (M75 121)   6  Hypercholesterolemia (272 0) (E78 00)   7  Hypothyroidism (244 9) (E03 9)   8  Malignant neoplasm without specification of site (199 1) (C80 1)   9  Rotator cuff tendinitis, unspecified laterality (726 10) (M75 80)   10  Shoulder tendonitis, right (726 10) (M75 81)    Past Medical History    1  Denied: History of seizure   2  Denied: History of traumatic injury of head    Allergies    1  No Known Drug Allergies    Current Meds   1  Divalproex Sodium 500 MG Oral Tablet Delayed Release;  Take 1 tablet twice daily; Therapy: (Recorded:[id number]) to Recorded   2  HydrOXYzine Pamoate 50 MG Oral Capsule; TAKE 1 CAPSULE Every 4 hours PRN; Therapy: (Caroline Tj) to Recorded   3  Levothyroxine Sodium 25 MCG Oral Tablet; TAKE 1 TABLET DAILY; Therapy: (Caroline Tj) to Recorded   4  Mirtazapine 15 MG Oral Tablet; TAKE 1 TABLET Bedtime; Therapy: (Caroline Tj) to Recorded   5  Omega 3 1000 MG Oral Capsule; Take 1 capsule twice daily; Therapy: 29Aug2017 to (Evaluate:58Och7745)  Requested for: 29Aug2017; Last   Rx:29Aug2017 Ordered   6  Wellbutrin 75 MG TABS; Take 1 tablet daily; Therapy: (Recorded:34Oji3549) to Recorded    Family Psych History  Mother    1  Family history of Alcohol abuse   2  Family history of depression (V17 0) (Z81 8)  Father    3  Family history of Alcohol abuse   4  Family history of depression (V17 0) (Z81 8)  Sister    5  Family history of Alcohol abuse  Family History    6   Family history of No Significant Family History    Social History    · Alcohol abuse (305 00) (F10 10)   · Completed 8th grade   · Current Every Day Smoker (305 1)   · Daily Coffee Consumption (___ Cups/Day)   · Disabled   ·     Future Appointments    Date/Time Provider Specialty Site   09/05/2017 10:00 AM Camillo Balloon, DO Psychiatry [facility]   09/06/2017 10:00 AM Camillo Balloon, DO Psychiatry [facility]   09/07/2017 10:00 AM Camillo Balloon, DO Psychiatry [facility]   09/08/2017 10:00 AM Camillo Balloon, DO Psychiatry [facility]     Signatures   Electronically signed by : JESSENIA Rojo; Sep  1 2017  3:01PM EST                       (Author)    Electronically signed by : KIRSTEN Mendez; Sep  1 2017  3:29PM EST                       (Author)    Electronically signed by : TONY Thakur; Sep  1 2017  3:53PM EST                       (Author)    Electronically signed by : Coty Lo RN; Sep  1 2017 4:18PM EST                       (Author)    Electronically signed by : Jovana Watts LCSW; Sep  1 2017  5:36PM EST                       (Author)

## 2018-01-11 NOTE — PSYCH
barriers to learning  Learning Assessment Time - 1330 - 1400   Education completed on  illness and wellness tools  The teaching method was  verbal and demonstration  Shared area of learning: Yes  JESSENIA Guzmán MT-BC     (867) Allied Therapy [phone] Bobby Ahmadi actively shared in [facility] group focused on communication  He engaged in non-verbal communication music tasks, and monico analysis  Group explored the barriers to and ways of communicating effectively  Group was introduced to GIVE as way to give and receive communication  He was given hand-out on topic  Bobby Ahmadi continues to engage and share in group more every day  Bobby Ahmadi shared that taking time to think out problems and clarify his position is something he can work on this week  Some small progress towards goal observed and shared  Continue AT to encourage the development of wellness tool to promote recovery  JESSENIA Guzmán  Treatment Plan Problem(s): 1 1, 1 2  TONY Farah     ( ) Other 12:35pm-return call from Gage Kamara at [facility]  She advised that they do have beds available in the vet program, but Bobby Ahmadi would need to go throught the [facility] in [facility] ([place], [phone] is contact), and then once they check his eligibility for benefits, they will make referral to [facility]  14:35-call to Steve Pierce at [facility]  Bobby Ahmadi will need to go to [facility] in [facility] with ID and a copy of his DD-214 and register with the clinic so that they can begin to offer him services  Steve Pierce is going to run a check on him to 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, in order to determine eligibility  NELLA Pond     Case Management Note:   12:05-12:10 This CM met briefly with Bobby Ahmadi to advise of progress on [facility]  Will make referral and try and move process along  TREATMENT SESSION NUMBER: 6   Current suicide risk is low  Medications not changed/added/denied   Su Ingram MSW, LSW      Active Problems    1  Alcohol abuse (305 00) (F10 10)   2  Bipolar disorder, current episode depressed, severe, without psychotic features (296 53)   (F31 4)   3  Chronic obstructive pulmonary disease (496) (J44 9)   4  Complete tear of left rotator cuff (727 61) (M75 122)   5  Complete tear of right rotator cuff (727 61) (M75 121)   6  Hypercholesterolemia (272 0) (E78 00)   7  Hypothyroidism (244 9) (E03 9)   8  Malignant neoplasm without specification of site (199 1) (C80 1)   9  Rotator cuff tendinitis, unspecified laterality (726 10) (M75 80)   10  Shoulder tendonitis, right (726 10) (M75 81)    Past Medical History    1  Denied: History of seizure   2  Denied: History of traumatic injury of head    Allergies    1  No Known Drug Allergies    Current Meds   1  Divalproex Sodium 500 MG Oral Tablet Delayed Release; Take 1 tablet twice daily; Therapy: (Recorded:71Wrs0019) to Recorded   2  HydrOXYzine Pamoate 50 MG Oral Capsule; TAKE 1 CAPSULE Every 4 hours PRN; Therapy: (Kayla Romano) to Recorded   3  Levothyroxine Sodium 25 MCG Oral Tablet; TAKE 1 TABLET DAILY; Therapy: (Kayla Romano) to Recorded   4  Mirtazapine 15 MG Oral Tablet; TAKE 1 TABLET Bedtime; Therapy: (Kayla Romano) to Recorded   5  Omega 3 1000 MG Oral Capsule; Take 1 capsule twice daily; Therapy: 00Vlx6534 to (Evaluate:14Tqd5259)  Requested for: 64Gut1675; Last   Rx:51Ctc1215 Ordered   6  Wellbutrin 75 MG TABS; Take 1 tablet daily; Therapy: (Recorded:97Mqm3652) to Recorded    Family Psych History  Mother    1  Family history of Alcohol abuse   2  Family history of depression (V17 0) (Z81 8)  Father    3  Family history of Alcohol abuse   4  Family history of depression (V17 0) (Z81 8)  Sister    5  Family history of Alcohol abuse  Family History    6   Family history of No Significant Family History    Social History    · Alcohol abuse (305 00) (F10 10)   · Completed 8th grade   · Current Every Day Smoker (305 1)   · Daily Coffee Consumption (___ Cups/Day)   · Disabled   ·     Future Appointments    Date/Time Provider Specialty Site   09/07/2017 10:00 AM Sam Greenwood DO Psychiatry [facility]   09/08/2017 10:00 AM Sam Greenwood DO Psychiatry [facility]     Signatures   Electronically signed by : Ishmael Landau, RN; Sep  6 2017  1:32PM EST                       (Author)    Electronically signed by : KIRSTEN Stewart; Sep  6 2017  1:37PM EST                       (Author)    Electronically signed by : JESSENIA Hooks; Sep  6 2017  2:34PM EST                       (Author)    Electronically signed by : KIRSTEN Stewart; Sep  6 2017  2:37PM EST                       (Author)    Electronically signed by : PREMA ProctorBC; Sep  6 2017  2:55PM EST                       (Author)    Electronically signed by : Lencho Rene LCSW; Sep  6 2017  3:17PM EST                       (Author)

## 2018-01-11 NOTE — PSYCH
History of Present Illness  [facility] Clinical Progress Note [facility]:   Specialized Services Documentation - Therapist must complete separate progress note for each specific clinical activity in which the client participated during the day  ([phone]) Group Psychotherapy: (9:30-10:30) Caye Crigler participated in psychotherapy group focused on dealing with negative thoughts  Caye Crigler stated he had no particular stressor on his mind today  He was mostly quiet during group discussion, but did appear attentive to peers through good eye contact throughout the hour  Mild progress toward goals noted  Continue psychotherapy group to encourage Caye Crigler to explore stressors and healthy ways of coping  Treatment Plan Problem(s): 1 1, 1 2  Jacqueline Zacarias MSW, LSW     (080) Group Psychotherapy: [id number] Caye Crigler participated in wellness group focused on self-assessment handout, education and discussion of one's personal network profile of support persons and how to extend supports through potential volunteering and other healthy strategies such as support groups and recognizing additional supports that have gone unrecognized  Caye Crigler was attentive and shared in education as well as peer exploration of supports that can be used in recovery from mental illness as well as for sobriety  Caye Crigler made moderate progress toward goals  Continue to offer group to educate on benefits of building a comprehensive support network and to not rely too heavily on one, or limited supports, in recovery and for better wellness  Treatment Plan Problem(s): 1 1,1 2,1 4,2 1  Ric Justice RN       (202) Education Therapy Goals set - Go to AA meeting    Treatment Plan Problem(s): 1 1  Education Therapy Time - 0900 - 0930 Previous goal was met  Readiness to Learning:  He is receptive to learning  There are  no barriers to learning  Learning Assessment Time - 1330 - 1400   Education completed on  illness and wellness tools     The teaching method was verbal and demonstration  Shared area of learning: Yes  JESSENIA Corey  Angeline Hernandez [facility]     (787) Allied Therapy 9660-6477 Irena Nicole actively shared in [facility] group focused on DBT skill of finding and getting people to like you  He engaged in question answering tasks and monico analysis  Group explored conversation skills, looking for people who are close by and similar to you, and understanding open versus closed groups  Group also explored being mindful of others  He was given hand-outs on topic  Irena Nicole identified his strengths as caring, a good sense of humor, and trustworthy  Irena Nicole shared he gained knowledge from the group  Some small progress towards goal noted  Continue AT to further encourage the use of wellness tool to promote recovery  JESSENIA Corey  Treatment Plan Problem(s): 1 1  Angeline Hernandez [facility]       Case Management Note:   (12:05-12:10) This CM met with Irena Nicole to provide script for liquid Depakote, as requested  Also discussed referral to [facility], but Irena Nicole noted that he cannot get the DD-214 form that is required because 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, 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  Advised that because of this, he is not eligible for VA services, per Lanette Anderson at the [facility]  He provided information on his secondary insurance policy through [employer], and this CM advised that other treatment alternatives would be investigated  TREATMENT SESSION NUMBER: 7   Current suicide risk is low  Medications not changed/added/denied  El Bentley MSW, LSW      Active Problems    1  Alcohol abuse (305 00) (F10 10)   2  Bipolar disorder, current episode depressed, severe, without psychotic features (296 53)   (F31 4)   3  Chronic obstructive pulmonary disease (496) (J44 9)   4  Complete tear of left rotator cuff (727 61) (M75 122)   5  Complete tear of right rotator cuff (727 61) (M75 121)   6  Hypercholesterolemia (272 0) (E78 00)   7   Hypothyroidism (244 9) (E03 9) 8  Malignant neoplasm without specification of site (199 1) (C80 1)   9  Rotator cuff tendinitis, unspecified laterality (726 10) (M75 80)   10  Shoulder tendonitis, right (726 10) (M75 81)    Past Medical History    1  Denied: History of seizure   2  Denied: History of traumatic injury of head    Allergies    1  No Known Drug Allergies    Current Meds   1  Divalproex Sodium 500 MG Oral Tablet Delayed Release; Take 1 tablet twice daily; Therapy: (Recorded:15Adu6485) to Recorded   2  HydrOXYzine Pamoate 50 MG Oral Capsule; TAKE 1 CAPSULE Every 4 hours PRN; Therapy: (Alleen Melissa) to Recorded   3  Levothyroxine Sodium 25 MCG Oral Tablet; TAKE 1 TABLET DAILY; Therapy: (Alleen Melissa) to Recorded   4  Mirtazapine 15 MG Oral Tablet; TAKE 1 TABLET Bedtime; Therapy: (Alleen Melissa) to Recorded   5  Omega 3 1000 MG Oral Capsule; Take 1 capsule twice daily; Therapy: 76Vzz4827 to (Evaluate:15Vro3415)  Requested for: 94Ywd9929; Last   Rx:22Cyc3999 Ordered   6  Wellbutrin 75 MG TABS; Take 1 tablet daily; Therapy: (Recorded:09Zys1026) to Recorded    Family Psych History  Mother    1  Family history of Alcohol abuse   2  Family history of depression (V17 0) (Z81 8)  Father    3  Family history of Alcohol abuse   4  Family history of depression (V17 0) (Z81 8)  Sister    5  Family history of Alcohol abuse  Family History    6   Family history of No Significant Family History    Social History    · Alcohol abuse (305 00) (F10 10)   · Completed 8th grade   · Current Every Day Smoker (305 1)   · Daily Coffee Consumption (___ Cups/Day)   · Disabled   ·     Future Appointments    Date/Time Provider Specialty Site   09/08/2017 10:00 AM Kamran Burton DO Psychiatry [facility]     Signatures   Electronically signed by : JESSENIA Somers; Sep  7 2017  2:11PM EST                       (Author)    Electronically signed by : Claudetta Clarke, MSWLSW; Sep  7 2017  2:24PM EST (Author)    Electronically signed by : TONY Eng; Sep  7 2017  3:31PM EST                       (Author)    Electronically signed by : Angie Euceda RN; Sep  7 2017  3:42PM EST                       (Author)    Electronically signed by : Angie Euceda RN; Sep  7 2017  5:14PM EST                       (Author)

## 2018-01-11 NOTE — PSYCH
History of Present Illness  [facility] Clinical Progress Note [facility]:   Specialized Services Documentation - Therapist must complete separate progress note for each specific clinical activity in which the client participated during the day  [phone]) Group Psychotherapy: (10:45-11:45) Keturah Harman participated in psychotherapy group focused on improving motivation and sleep  Keturah Harman identified wanting to be done with program as a stressor today  He expressed feeling as though he has made improvements and that he has been here for a long time and is ready to move on  He was otherwise quiet yet attentive during the remainder of group discussion  Some mild progress noted toward goals  Discharge at the end of program day today  Treatment Plan Problem(s): 1 1, 1 2  Claudetta Clarke MSW, LSW     (231) Group Psychotherapy: (12:30-1:30 pm) Co-occurring disorder group  Keturah Harman participated in group  Topics were the abuse of illegal drugs, prescription drugs and over the counter drugs and relapse prevention  His last reported use was "32 days ago " He "does not talk a lot in the [place] [address] meetings but he does talk when he needs to " A fellow group member gave him info on places that rent out rooms  We discussed [place]  [facility] progress on goal  Discharge today  Treatment Plan Problem(s): 2 1  Morteza Ortiz LCSW       (423) Education Therapy   Treatment Plan Problem(s): 1 0    Education Therapy Time - 0900 - 0930 Previous goal was met  Readiness to Learning:  He is receptive to learning  There are  no barriers to learning  Learning Assessment Time - 1330 - 1400   Education completed on  illness, medication, aftercare and wellness tools  The teaching method was  verbal  Shared area of learning: Yes  Named 5 key facets of wellness  Discharge today at end of program day  JESSENIA Somers MT-BC     (167) Allied Therapy [phone] Keturah Harman actively shared in [facility] group focused on [address] Regulation  He engaged in monico analysis and emotion jeopardy task  Group explored difference between feeling, emotion, and mood  Group also explored biological reactions, wellness tools, prompting events, and myths or truths of emotions  Sultana Christine spontaneously engaged in task answering questions, and shared engaging in hobbies (âfishingâ) as a wellness tool  Some small progress towards goal observed and shared  Discharge today at end of program day  JESSENIA Bolden  Treatment Plan Problem(s): 1 1  Hailee Nunn MT-BC       Case Management Note:   12:20-12:25 This CM met with Sultana Christine to review discharge instructions, relapse prevention plan and medication reconciliation list  He signed and received copies of forms  He expressed remaining sober, improved mood and more socialization as signs of improvement  Denies SI, HI and psychosis today  TREATMENT SESSION NUMBER: 15   Current suicide risk is low  Medications not changed/added/denied  Aleksandra Rashid MSW, LSW   [facility] Follow-up Physician's Orders:   9/22/2017  8:19 AM Discharge today   MD Cristian Bobo RN          Active Problems    1  Alcohol abuse (305 00) (F10 10)   2  Bipolar disorder, current episode depressed, severe, without psychotic features (296 53)   (F31 4)   3  Chronic obstructive pulmonary disease (496) (J44 9)   4  Complete tear of left rotator cuff (727 61) (M75 122)   5  Complete tear of right rotator cuff (727 61) (M75 121)   6  High risk medication use (V58 69) (Z79 899)   7  Hypercholesterolemia (272 0) (E78 00)   8  Hypothyroidism (244 9) (E03 9)   9  Malignant neoplasm without specification of site (199 1) (C80 1)   10  Rotator cuff tendinitis, unspecified laterality (726 10) (M75 80)   11  Shoulder tendonitis, right (726 10) (M75 81)    Past Medical History    1  Denied: History of seizure   2  Denied: History of traumatic injury of head    Allergies    1  No Known Drug Allergies    Current Meds   1  BuPROPion HCl - 75 MG Oral Tablet; Take 1 tablet daily; Last Rx:05Ioe8032 Ordered   2  HydrOXYzine Pamoate 50 MG Oral Capsule; TAKE 1 CAPSULE Every 4 hours PRN; Last   Rx:01Awb1373 Ordered   3  Levothyroxine Sodium 25 MCG Oral Tablet; TAKE 1 TABLET DAILY  Requested for:   68IXQ7637; Last Rx:62Rgm1599 Ordered   4  Mirtazapine 15 MG Oral Tablet; TAKE 1 TABLET Bedtime; Last Rx:48Ner7475 Ordered   5  Omega 3 1000 MG Oral Capsule; Take 1 capsule twice daily; Therapy: 03Guu2886 to (Evaluate:18Pkw6796)  Requested for: 63Tvv3380; Last   Rx:70Prh5267 Ordered   6  Valproate Sodium 250 MG/5ML Oral Solution; TAKE 10ML TWICE DAILY; Therapy: 07Sep2017 to (Last Rx:07Sep2017) Ordered    Family Psych History  Mother    1  Family history of Alcohol abuse   2  Family history of depression (V17 0) (Z81 8)  Father    3  Family history of Alcohol abuse   4  Family history of depression (V17 0) (Z81 8)  Sister    5  Family history of Alcohol abuse  Family History    6  Family history of No Significant Family History    Social History    · Alcohol abuse (305 00) (F10 10)   · Completed 8th grade   · Current Every Day Smoker (305 1)   · Daily Coffee Consumption (___ Cups/Day)   · Disabled   ·     Future Appointments    Date/Time Provider Specialty Site   09/22/2017 10:30 AM JALEEL Reeves   Psychiatry [facility]     Signatures   Electronically signed by : JALEEL Hooks ; Sep 22 2017  8:21AM EST                       (Author)    Electronically signed by : Geovanny Hernandez RN; Sep 22 2017  8:50AM EST                       (Author)    Electronically signed by : KIRSTEN Ball; Sep 22 2017  2:32PM EST                       (Author)    Electronically signed by : JESSENIA Chaparro; Sep 22 2017  2:45PM EST                       (Author)    Electronically signed by : TONY Mckeon; Sep 22 2017  3:33PM EST                       (Author)    Electronically signed by : Irish Acharya LCSW; Sep 22 2017  5:40PM EST                       (Author)

## 2018-01-11 NOTE — PSYCH
History of Present Illness  [facility] Clinical Progress Note [facility]:   Specialized Services Documentation - Therapist must complete separate progress note for each specific clinical activity in which the client participated during the day  [phone]) Group Psychotherapy: (9:30-10:30) Dave Rao participated in psychotherapy group focused on mental health stigma and ways to push through lack of motivation  Dave Rao identified trying to find a new place to live as a stressor  He was otherwise quiet throughout group discussion, although he did appear attentive to peers through good eye contact with each speaker  Minimal progress noted toward goals  Continue psychotherapy group to encourage Dave Rao to explore stressors and coping  Treatment Plan Problem(s): 1 1, 1 2, 1 4  Surekha Arnold MSW, LSW     (408) Group Psychotherapy: [id number] Dave Rao participated in wellness group focused on learning ways the immune system is often negatively affective with mental illness and ways to improve your immune system to increase both physical and mental health wellness  Dave Rao was attentive to education and handout but did not share in discussion during group  Dave Rao made slow progress toward goals  Continue to offer group to provide education on the immune system and how best to increase immunity when coping with mental illness  Treatment Plan Problem(s): 1 1,1 2  Naheed Ward RN       (162) Education Therapy Goals set - Work on relapse prevention plan    Treatment Plan Problem(s): 1 1, 1 5  Education Therapy Time - 0900 - 0930 Previous goal was met  Readiness to Learning:  He is receptive to learning  There are  no barriers to learning  Learning Assessment Time - 1330 - 1400   Education completed on  illness and wellness tools  The teaching method was  verbal and demonstration  Shared area of learning: Yes  JESSENIA Agarwal MT-BC     (151) Allied Therapy 1570-2919 Dave Rao quietly shared in [facility] group focused on Interpersonal Effectiveness skill Darlene  Group explored objectives in interactions and effective ways to express  He appeared attentive during monico analysis and needed prompts to engage in small group practice  Group practiced identifying objectives and ways to get needs met  Inconsistent work toward goal noted  Continue AT to encourage skill awareness, development and practice to improve interpersonal effectiveness  Treatment Plan Problem(s): 1 4, 1 1  Angeline Brothers, MT-BC       Case Management Note:   12:15-12:20 This CM met with Irena Nicole to discuss discharge readiness for tomorrow  He stated that he feels good about discharge tomorrow  He has been regularly attending AA, so he feels he is doing well refraining from alcohol  He understands that he will follow up outpatient with Dr Chad Lewis in [place]  He is still in the process of finding alternative housing, and will continue to follow up on housing resources provided by this CM  He stated that another patient knows of a half way house in [facility] that might have availability  He will get that information tomorrow  Encouraged Irena Nicole to continue to seek out resources himself  TREATMENT SESSION NUMBER: 14   Current suicide risk is low  Medications not changed/added/denied  El Bentley MSW, LSW      Active Problems    1  Alcohol abuse (305 00) (F10 10)   2  Bipolar disorder, current episode depressed, severe, without psychotic features (296 53)   (F31 4)   3  Chronic obstructive pulmonary disease (496) (J44 9)   4  Complete tear of left rotator cuff (727 61) (M75 122)   5  Complete tear of right rotator cuff (727 61) (M75 121)   6  High risk medication use (V58 69) (Z79 899)   7  Hypercholesterolemia (272 0) (E78 00)   8  Hypothyroidism (244 9) (E03 9)   9  Malignant neoplasm without specification of site (199 1) (C80 1)   10  Rotator cuff tendinitis, unspecified laterality (726 10) (M75 80)   11   Shoulder tendonitis, right (726 10) (M75 81)    Past Medical History    1  Denied: History of seizure   2  Denied: History of traumatic injury of head    Allergies    1  No Known Drug Allergies    Current Meds   1  BuPROPion HCl - 75 MG Oral Tablet; Take 1 tablet daily; Last Rx:28Tyl1754 Ordered   2  HydrOXYzine Pamoate 50 MG Oral Capsule; TAKE 1 CAPSULE Every 4 hours PRN; Last   Rx:92Qct4214 Ordered   3  Levothyroxine Sodium 25 MCG Oral Tablet; TAKE 1 TABLET DAILY  Requested for:   20PDX6774; Last Rx:50Vcb6299 Ordered   4  Mirtazapine 15 MG Oral Tablet; TAKE 1 TABLET Bedtime; Last Rx:34Cxh2572 Ordered   5  Omega 3 1000 MG Oral Capsule; Take 1 capsule twice daily; Therapy: 01Jtw3332 to (Evaluate:19Oct2017)  Requested for: 19Sep2017; Last   Rx:22Vlz9842 Ordered   6  Valproate Sodium 250 MG/5ML Oral Solution; TAKE 10ML TWICE DAILY; Therapy: 24Zzx0412 to (Last Rx:07Sep2017) Ordered    Family Psych History  Mother    1  Family history of Alcohol abuse   2  Family history of depression (V17 0) (Z81 8)  Father    3  Family history of Alcohol abuse   4  Family history of depression (V17 0) (Z81 8)  Sister    5  Family history of Alcohol abuse  Family History    6  Family history of No Significant Family History    Social History    · Alcohol abuse (305 00) (F10 10)   · Completed 8th grade   · Current Every Day Smoker (305 1)   · Daily Coffee Consumption (___ Cups/Day)   · Disabled   ·     Future Appointments    Date/Time Provider Specialty Site   09/22/2017 10:30 AM JALEEL Chiu   [facility]     Signatures   Electronically signed by : KIRSTEN Guthrie; Sep 21 2017  1:02PM EST                       (Author)    Electronically signed by : TONY Hernandes; Sep 21 2017  2:31PM EST                       (Author)    Electronically signed by : JESSENIA Mendez; Sep 21 2017  2:57PM EST                       (Author)    Electronically signed by : Nasra Ward RN; Sep 21 2017  4:23PM EST (Author)

## 2018-01-12 NOTE — PSYCH
History of Present Illness  [facility]:   Specialized Services Documentation - Therapist must complete separate progress note for each specific clinical activity in which the client participated during the day  Case Management Note: Individual Case Management visit not provided today  Jessi Moreira did not answer door for Tash torres today, and therefore did not arrive for program   9:20am-call from Jessi Moreira, advising that he overslept and missed the Tash torres  He apologized, and stated that he will be back to program on Monday  He plans to use today to get some important errands done now, such as getting his license taken care of  Otherwise he is doing well  Current suicide risk is low  Medications not changed/added/denied  Nate Holcomb MSW, LSW      Active Problems    1  Alcohol abuse (305 00) (F10 10)   2  Bipolar disorder, current episode depressed, severe, without psychotic features (296 53)   (F31 4)   3  Chronic obstructive pulmonary disease (496) (J44 9)   4  Complete tear of left rotator cuff (727 61) (M75 122)   5  Complete tear of right rotator cuff (727 61) (M75 121)   6  Hypercholesterolemia (272 0) (E78 00)   7  Hypothyroidism (244 9) (E03 9)   8  Malignant neoplasm without specification of site (199 1) (C80 1)   9  Rotator cuff tendinitis, unspecified laterality (726 10) (M75 80)   10  Shoulder tendonitis, right (726 10) (M75 81)    Past Medical History    1  Denied: History of seizure   2  Denied: History of traumatic injury of head    Allergies    1  No Known Drug Allergies    Current Meds   1  HydrOXYzine Pamoate 50 MG Oral Capsule; TAKE 1 CAPSULE Every 4 hours PRN; Therapy: (Recorded:47Ubi4982) to Recorded   2  Levothyroxine Sodium 25 MCG Oral Tablet; TAKE 1 TABLET DAILY; Therapy: (Suan Needle) to Recorded   3  Mirtazapine 15 MG Oral Tablet; TAKE 1 TABLET Bedtime; Therapy: (Suan Needle) to Recorded   4  Omega 3 1000 MG Oral Capsule;  Take 1 capsule twice daily; Therapy: 29Aug2017 to (Evaluate:28Sep2017)  Requested for: 39Hec4378; Last   Rx:[id number] Ordered   5  Valproate Sodium 250 MG/5ML Oral Solution; TAKE 10ML TWICE DAILY; Therapy: 07Sep2017 to (Last Rx:07Sep2017) Ordered   6  Wellbutrin 75 MG TABS; Take 1 tablet daily; Therapy: (Recorded:23Tgz6386) to Recorded    Family Psych History  Mother    1  Family history of Alcohol abuse   2  Family history of depression (V17 0) (Z81 8)  Father    3  Family history of Alcohol abuse   4  Family history of depression (V17 0) (Z81 8)  Sister    5  Family history of Alcohol abuse  Family History    6   Family history of No Significant Family History    Social History    · Alcohol abuse (305 00) (F10 10)   · Completed 8th grade   · Current Every Day Smoker (305 1)   · Daily Coffee Consumption (___ Cups/Day)   · Disabled   ·     Future Appointments    Date/Time Provider Specialty Site   09/08/2017 10:00 AM Mayur Nicholson DO Psychiatry [facility] HOSPITAL[facility]     Signatures   Electronically signed by : KIRSTEN Leary; Sep  8 2017  8:52AM EST                       (Author)    Electronically signed by : KIRSTEN Leary; Sep  8 2017  9:27AM EST                       (Author)

## 2018-01-12 NOTE — PSYCH
History of Present Illness  [facility]:   Specialized Services Documentation - Therapist must complete separate progress note for each specific clinical activity in which the client participated during the day  ([phone]) Group Psychotherapy: (9:30-10:30) Katie Aguirre participated in psychotherapy group focused on fear of change  Katie Aguirre identified drugs and alcohol being present in his home as a stressor  He was otherwise quiet during group discussion, at times appearing to struggle to stay awake  No outward progress toward goals noted  Continue psychotherapy group to encourage Katie Aguirre to explore stressors and coping  Treatment Plan Problem(s): 1 1, 1 2  Jones Lisa MSW, LSW     (662) Group Psychotherapy: [phone] Katie Aguirre participated in creating a Crisis Card according to a format listed on handout and to personalize with individual information  Also, an educational handout was shared to self-assess what additional information would be necessary to add to Crisis Card such as medications, warning signs and triggers  made progress toward goal  Katie Aguirre was distracted during group looking at paperwork he was writing on  When asked if he needed assistance to complete Crisis Card he answered that he was listening but this CM did not see his completed card  Katie Aguirre stated that he was "paying attention" but did not appear to follow education  Katie Aguirre made no visible progress toward goal  Continue to offer group to prepare for the possibility of a mental health crisis and strategies to handle in healthier ways including crisis contacts  Treatment Plan Problem(s): 1 1,1 2  Ishmael Landau, RN       (403) Education Therapy Goals set - Rest    Treatment Plan Problem(s): 1 1  Education Therapy Time - 0900 - 0930 (First treatment day)   Readiness to Learning:  He is receptive to learning  There are  no barriers to learning    Learning Assessment Time - 1330 - 1400   Education completed on  illness, medication and wellness tools  The teaching method was  verbal and demonstration  Shared area of learning: Yes  JESSENIA Pérez MT-BC     (249) Allied Therapy [phone] Glovervillesridhar Gomez actively shared in [facility] group focused on self-care  He engaged in task of monico analysis and identifying ways of and barriers to self-care  Group explored ideas of self-care versus selfishness, and PLEASE as the different aspects of physical self-care  Group was introduced to an object meditation  Alonsosridhar Millerqi was given hand-outs on topic  Alonso Gomez shared that he enjoys fishing and keeping an open mind is something he can do for his self-care  Beginning progress towards goal noted  Continue AT to further encourage the use of wellness tool to promote recovery  JESSENIA Pérez  Treatment Plan Problem(s): 1 1  TONY Casarez       Case Management Note:   10:50-11:20 This CM met with Alonso Gomez for initial assessment  PIOTR signed for emergency contact only, as he does not currently have a PCP or outpatient psych providers  Program basics explained, including co-occurring track, and crisis/on-call numbers provided  Denies current SI, HI or psychosis  Has been drug/alcohol free since date of admission into inpatient unit, and would like to remain sober  Will be returning to [employer] and getting another sponsor (previous sponsor passed away)  TREATMENT SESSION NUMBER: 1   Current suicide risk is low  Medications not changed/added/denied  Versa Anisha MSW, LSW      Active Problems    1  Alcohol abuse (305 00) (F10 10)   2  Bipolar disorder, current episode depressed, severe, without psychotic features (296 53)   (F31 4)   3  Chronic obstructive pulmonary disease (496) (J44 9)   4  Complete tear of left rotator cuff (727 61) (M75 122)   5  Complete tear of right rotator cuff (727 61) (M75 121)   6  Hypercholesterolemia (272 0) (E78 00)   7  Hypothyroidism (244 9) (E03 9)   8   Malignant neoplasm without specification of site (199 1) (C80 1)   9  Rotator cuff tendinitis, unspecified laterality (726 10) (M75 80)   10  Shoulder tendonitis, right (726 10) (M75 81)    Past Medical History    1  Denied: History of seizure   2  Denied: History of traumatic injury of head    Allergies    1  No Known Drug Allergies    Current Meds   1  Divalproex Sodium 500 MG Oral Tablet Delayed Release; Take 1 tablet twice daily; Therapy: (Recorded:91Foa7232) to Recorded   2  HydrOXYzine Pamoate 50 MG Oral Capsule; TAKE 1 CAPSULE Every 4 hours PRN; Therapy: (Gerlene Living) to Recorded   3  Levothyroxine Sodium 25 MCG Oral Tablet; TAKE 1 TABLET DAILY; Therapy: (Gerlene Living) to Recorded   4  Mirtazapine 15 MG Oral Tablet; TAKE 1 TABLET Bedtime; Therapy: (Gerlene Living) to Recorded   5  Omega 3 1000 MG Oral Capsule; Take 1 capsule twice daily; Therapy: 65Xvh3275 to (Evaluate:04Ypj9956)  Requested for: 46Lli1688; Last   Rx:78Yjg9534 Ordered   6  Wellbutrin 75 MG TABS (BuPROPion HCl); Take 1 tablet daily; Therapy: (Recorded:21Ozv3124) to Recorded    Family Psych History  Mother    1  Family history of Alcohol abuse   2  Family history of depression (V17 0) (Z81 8)  Father    3  Family history of Alcohol abuse   4  Family history of depression (V17 0) (Z81 8)  Sister    5  Family history of Alcohol abuse  Family History    6  Family history of No Significant Family History    Social History    · Alcohol abuse (305 00) (F10 10)   · Completed 8th grade   · Current Every Day Smoker (305 1)   · Daily Coffee Consumption (___ Cups/Day)   · Disabled   ·     Future Appointments    Date/Time Provider Specialty Site   08/30/2017 10:00 AM JALEEL Judge   Psychiatry [facility]   08/31/2017 10:00 AM [facility]   09/01/2017 10:00 AM Jeannie Quiñonez MD Psychiatry [facility] PART[facility] H[facility]     Signatures   Electronically signed by : Rafi Davis, MSWLSW; Aug 29 2017  1:18PM EST                       (Author)    Electronically signed by : Madeline Wilkins RN; Aug 29 2017  1:57PM EST                       (Author)    Electronically signed by : Annetta Cowden, MTI; Aug 29 2017  2:58PM EST                       (Author)    Electronically signed by : TONY Crockett; Aug 29 2017  3:11PM EST                       (Author)

## 2018-01-12 NOTE — PSYCH
History of Present Illness  [facility]:   Specialized Services Documentation - Therapist must complete separate progress note for each specific clinical activity in which the client participated during the day  [phone]) Group Psychotherapy: [phone] Angélica Dia participated in wellness group focused on signs and symptoms of major depression and/or anxiety and how each individual in group can achieve their goal of improving wellness and diminishing symptoms  Angélica Dia was attentive but minimally shared during peer discussion  Angélica Dia denied anhedonia stating that he enjoys fishing as stress relief and has not let this go although peers were sharing that they had experienced feelings of depression so deep that they no longer derived natacha from activities that previously positively effected their mood  Angélica Dia made mild progress toward goals  Continue to offer education group and opportunity to identify what individually what education and skills are necessary to pursue to achieve better mental health wellness  Treatment Plan Problem(s): 1 1,1 2  Leroy Patel RN     (061) Group Psychotherapy: (10:45-11:45) Angélica Dia participated in psychotherapy group focused on abandonment/hurt in relationships, and endings  Angélica Dia identified money going to pay his ex's rent as a stressor  He actively engaged in discussion, talking about the difficulty of having a parent not be a part of one's life such as his own father, and the hurt he feels now that his son will not have anything to do with him  Group discussed the importance of recognizing that one is not responsible for others' thoughts/feelings/actions, and that those who hurt them make their own choices which have nothing to do with one's value or worth as a person  Some moderate progress toward goals noted  Continue psychotherapy group to encourage Angélica Dia to explore stressors and coping       Treatment Plan Problem(s): 1 1, 1 2   Jef Rogers MSW, LSW [phone]) Education Therapy Goals set - Go to AA, work on [employer] Problem(s): 1 1, 1 5  Education Therapy Time - 0900 - 0930 Previous goal was met  Readiness to Learning:  He is receptive to learning  There are  no barriers to learning  Learning Assessment Time - 1330 - 1400   Education completed on  illness and wellness tools  The teaching method was  verbal and demonstration  Shared area of learning: Yes  Wyvonne Cowing, MTI Scottie Bumpers, MT-BC     (723) Allied Therapy [phone] Phuc Grubbs actively shared in [facility] group focused on DBT skill of options for solving problems  He engaged in movement task and monico analysis  Group explored the four options to problem solving  Group also explored desired behaviors to decrease using skills to increase, with knowing the pros and cons of each  He was given hand-outs on topic  Phuc Grubbs shared that a goal he needs to work on is increasing setting boundaries and saying no  Some small progress towards goal observed and shared  Continue AT to further encourage the use of wellness tool to promote recovery  JESSENIA Loaiza  Treatment Plan Problem(s): 1 1  Scottie Bumpers, MT-BC     ( ) Other 10:24-left message for Elisabeth [facility] of [facility] ([phone])  Asked for return call re: availability in program      Case Management Note:   (10:45-10:50) This CM met with Phuc Grubbs briefly  He stated that although he missed the last two days of program due to [place] issues, he is doing well  He was able to attend AA meetings over the weekend and got some things taken care of that he needed to, such as getting his license  This CM advised that calls to [employer] and [facility] were made and this CM is awaiting return calls from them, as well as outpatient psych providers  TREATMENT SESSION NUMBER: 8   Current suicide risk is low  Medications not changed/added/denied   Emmanuel Vitale MSW, LSW Active Problems    1  Alcohol abuse (305 00) (F10 10)   2  Bipolar disorder, current episode depressed, severe, without psychotic features (296 53)   (F31 4)   3  Chronic obstructive pulmonary disease (496) (J44 9)   4  Complete tear of left rotator cuff (727 61) (M75 122)   5  Complete tear of right rotator cuff (727 61) (M75 121)   6  Hypercholesterolemia (272 0) (E78 00)   7  Hypothyroidism (244 9) (E03 9)   8  Malignant neoplasm without specification of site (199 1) (C80 1)   9  Rotator cuff tendinitis, unspecified laterality (726 10) (M75 80)   10  Shoulder tendonitis, right (726 10) (M75 81)    Past Medical History    1  Denied: History of seizure   2  Denied: History of traumatic injury of head    Allergies    1  No Known Drug Allergies    Current Meds   1  HydrOXYzine Pamoate 50 MG Oral Capsule; TAKE 1 CAPSULE Every 4 hours PRN; Therapy: (Recorded:00Kzz0549) to Recorded   2  Levothyroxine Sodium 25 MCG Oral Tablet; TAKE 1 TABLET DAILY; Therapy: (Dori Gonzalez) to Recorded   3  Mirtazapine 15 MG Oral Tablet; TAKE 1 TABLET Bedtime; Therapy: (Dori Gonzalez) to Recorded   4  Omega 3 1000 MG Oral Capsule; Take 1 capsule twice daily; Therapy: 84Wrc0279 to (Evaluate:28Sep2017)  Requested for: 28Boz5077; Last   Rx:85Tqv8443 Ordered   5  Valproate Sodium 250 MG/5ML Oral Solution; TAKE 10ML TWICE DAILY; Therapy: 46Vef6872 to (Last Rx:94Coz1106) Ordered   6  Wellbutrin 75 MG TABS; Take 1 tablet daily; Therapy: (Recorded:75Dwf1651) to Recorded    Family Psych History  Mother    1  Family history of Alcohol abuse   2  Family history of depression (V17 0) (Z81 8)  Father    3  Family history of Alcohol abuse   4  Family history of depression (V17 0) (Z81 8)  Sister    5  Family history of Alcohol abuse  Family History    6   Family history of No Significant Family History    Social History    · Alcohol abuse (305 00) (F10 10)   · Completed 8th grade   · Current Every Day Smoker (305 1)   · Daily Coffee Consumption (___ Cups/Day)   · Disabled   ·     Future Appointments    Date/Time Provider Specialty Site   09/13/2017 10:00 AM Marilu Timmons DO Psychiatry [facility]   09/14/2017 10:00 AM Marilu Timmons, DO Psychiatry [facility]   09/15/2017 10:00 AM Marilu Timmons DO Psychiatry [facility]     Signatures   Electronically signed by : Marvella Blizzard, MTI; Sep 12 2017  2:30PM EST                       (Author)    Electronically signed by : TONY Zvaala; Sep 12 2017  2:43PM EST                       (Author)    Electronically signed by : Leroy Patel RN; Sep 12 2017  3:44PM EST                       (Author)    Electronically signed by : KIRSTEN Bahena; Sep 12 2017  4:04PM EST                       (Author)

## 2018-01-12 NOTE — PSYCH
History of Present Illness  [facility]:   Specialized Services Documentation - Therapist must complete separate progress note for each specific clinical activity in which the client participated during the day  Case Management Note: Individual Case Management visit not provided today  Henreitta Osler was excused from program today due to Mile Fisher not coming to pick him up  Current suicide risk is low  Medications not changed/added/denied  Armando Boydxiang MSW, LSW      Active Problems    1  Alcohol abuse (305 00) (F10 10)   2  Bipolar disorder, current episode depressed, severe, without psychotic features (296 53)   (F31 4)   3  Chronic obstructive pulmonary disease (496) (J44 9)   4  Complete tear of left rotator cuff (727 61) (M75 122)   5  Complete tear of right rotator cuff (727 61) (M75 121)   6  Hypercholesterolemia (272 0) (E78 00)   7  Hypothyroidism (244 9) (E03 9)   8  Malignant neoplasm without specification of site (199 1) (C80 1)   9  Rotator cuff tendinitis, unspecified laterality (726 10) (M75 80)   10  Shoulder tendonitis, right (726 10) (M75 81)    Past Medical History    1  Denied: History of seizure   2  Denied: History of traumatic injury of head    Allergies    1  No Known Drug Allergies    Current Meds   1  HydrOXYzine Pamoate 50 MG Oral Capsule; TAKE 1 CAPSULE Every 4 hours PRN; Therapy: (Recorded:42Xej7246) to Recorded   2  Levothyroxine Sodium 25 MCG Oral Tablet; TAKE 1 TABLET DAILY; Therapy: (Daiva Cyphers) to Recorded   3  Mirtazapine 15 MG Oral Tablet; TAKE 1 TABLET Bedtime; Therapy: (Daiva Cyphers) to Recorded   4  Omega 3 1000 MG Oral Capsule; Take 1 capsule twice daily; Therapy: 55Xyl6695 to (Evaluate:28Sep2017)  Requested for: 84Etl0963; Last   Rx:11Dpu3311 Ordered   5  Valproate Sodium 250 MG/5ML Oral Solution; TAKE 10ML TWICE DAILY; Therapy: 07Sep2017 to (Last Rx:07Sep2017) Ordered   6   Wellbutrin 75 MG TABS (BuPROPion HCl); Take 1 tablet daily; Therapy: (Recorded:35Vim8485) to Recorded    Family Psych History  Mother    1  Family history of Alcohol abuse   2  Family history of depression (V17 0) (Z81 8)  Father    3  Family history of Alcohol abuse   4  Family history of depression (V17 0) (Z81 8)  Sister    5  Family history of Alcohol abuse  Family History    6   Family history of No Significant Family History    Social History    · Alcohol abuse (305 00) (F10 10)   · Completed 8th grade   · Current Every Day Smoker (305 1)   · Daily Coffee Consumption (___ Cups/Day)   · Disabled   ·     Future Appointments    Date/Time Provider Specialty Site   [facility]2017 [facility]   09/13/2017 10:00 AM [facility],  [facility]iatry [facility]   09/14/2017 10:00 AM [facility],  [facility]iatry [facility]   09/15/2017 10:00 AM [facility]     Signatures   Electronically signed by : KIRSTEN Patel; Sep 11 2017 10:40AM EST                       (Author)
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
## 2018-01-13 NOTE — PSYCH
History of Present Illness  [facility]:   Specialized Services Documentation - Therapist must complete separate progress note for each specific clinical activity in which the client participated during the day  ([phone]) Group Psychotherapy: (9:30-10:30) Katie Aguirre attended psychotherapy group focused on boundaries  Katie Aguirre identified not knowing where his phone is as a stressor today  He was otherwise quiet throughout group, and did seem to struggle to stay away  He was asleep at the end of group and needed to be woken up by this writer to take his break  No progress noted toward goals  Continue psychotherapy group to encourage Katie Aguirre to explore stressors and coping  Treatment Plan Problem(s): 1 1, 1 2, 1 4  Jones Lisa MSW, LSW     (944) Group Psychotherapy: (12:30-1:30pm) Co-occurring group   Katie Aguirre participated in group  Topics discussed were reasons why there is a policy here of âno drugs and alcohol while in program,â the variety of negative coping skills and where they lead and stress and drugs and alcohol  There were 3 new group members who shared their histories of mental health and drugs and alcohol  He was new to the group  He has a long history of use with periods of sobriety  Has been drinking currently for 2-3 years after having 6 years sober  He was just discharged from a d&a program and referred here  No progress on goal  Continue with group  Treatment Plan Problem(s): 2 1  Hall Rinne, LCSW       (086) Education Therapy Goals set - Find phone    Treatment Plan Problem(s): 1 1  Education Therapy Time - 0900 - 0930 Previous goal was not met  Readiness to Learning:  He is receptive to learning  There are  no barriers to learning  Learning Assessment Time - 1330 - 1400   Education completed on  illness and wellness tools  The teaching method was  verbal and demonstration  Shared area of learning: Yes  Zoë Myrick, JESSENIA Nesbitt, MT-BC (114) Allied Therapy 9929-5255 Sultana Christine actively shared in [facility] group focused on relaxation techniques and irrational fears  He engaged in therapist-led relaxation exercises and monico analysis  Group explored diaphragmatic breathing, progressive muscle relaxation, breath counting, and visualization  Group also explored worry and panic cycle, and how to break away from irrational fears  He was given hand-out on topic  Sultana Christine did not say anything during group, but appeared attentive and engaged to topic and peers throughout  Some beginning progress towards goal noted  Continue AT to further encourage the use of wellness tool to promote recovery  JESSENIA Bolden  Treatment Plan Problem(s): 1 1  Hailee Nunn MT-BC       Case Management Note:   12:25-12:30 This CM met with Sultana Abebe to review treatment plan, which he signed and received copy of  Discussed outpatient providers and gave him a list of PCPs in his local area  He stated Dr Yesica Piecre is very close to his home, so he will call him for appointment  Also discussed possibility of housing at [facility], which he was open to exploring  TREATMENT SESSION NUMBER: 2   Current suicide risk is low  Medications not changed/added/denied  Aleksandra Rashid MSW, LSW      Active Problems    1  Alcohol abuse (305 00) (F10 10)   2  Bipolar disorder, current episode depressed, severe, without psychotic features (296 53)   (F31 4)   3  Chronic obstructive pulmonary disease (496) (J44 9)   4  Complete tear of left rotator cuff (727 61) (M75 122)   5  Complete tear of right rotator cuff (727 61) (M75 121)   6  Hypercholesterolemia (272 0) (E78 00)   7  Hypothyroidism (244 9) (E03 9)   8  Malignant neoplasm without specification of site (199 1) (C80 1)   9  Rotator cuff tendinitis, unspecified laterality (726 10) (M75 80)   10  Shoulder tendonitis, right (726 10) (M75 81)    Past Medical History    1  Denied: History of seizure   2   Denied: History of traumatic injury of head    Allergies    1  No Known Drug Allergies    Current Meds   1  Divalproex Sodium 500 MG Oral Tablet Delayed Release; Take 1 tablet twice daily; Therapy: (Recorded:33Rnm8013) to Recorded   2  HydrOXYzine Pamoate 50 MG Oral Capsule; TAKE 1 CAPSULE Every 4 hours PRN; Therapy: (Rupert Mace) to Recorded   3  Levothyroxine Sodium 25 MCG Oral Tablet; TAKE 1 TABLET DAILY; Therapy: (Rupert Mace) to Recorded   4  Mirtazapine 15 MG Oral Tablet; TAKE 1 TABLET Bedtime; Therapy: (Rupert Mace) to Recorded   5  Omega 3 1000 MG Oral Capsule; Take 1 capsule twice daily; Therapy: 34Xvc2538 to (Evaluate:58Vbc2196)  Requested for: 29Grd5791; Last   Rx:16Zku1441 Ordered   6  Wellbutrin 75 MG TABS; Take 1 tablet daily; Therapy: (Recorded:89Fst5026) to Recorded    Family Psych History  Mother    1  Family history of Alcohol abuse   2  Family history of depression (V17 0) (Z81 8)  Father    3  Family history of Alcohol abuse   4  Family history of depression (V17 0) (Z81 8)  Sister    5  Family history of Alcohol abuse  Family History    6   Family history of No Significant Family History    Social History    · Alcohol abuse (305 00) (F10 10)   · Completed 8th grade   · Current Every Day Smoker (305 1)   · Daily Coffee Consumption (___ Cups/Day)   · Disabled   ·     Future Appointments    Date/Time Provider Specialty Site   08/31/2017 10:00 AM Samantha Monroy MD Psychiatry [facility] PARTIAL HOSPITALIZATION   09/01/2017 10:00 AM Samantha Monroy MD Psychiatry [facility]     Signatures   Electronically signed by : KIRSTEN Vang; Aug 30 2017  1:46PM EST                       (Author)    Electronically signed by : Ruba Trimble LCSW; Aug 30 2017  2:02PM EST                       (Author)    Electronically signed by : JESSENIA Bruno; Aug 30 2017  2:05PM EST                       (Author)    Electronically signed by : Ruba Trimble LCSW; Aug 30 2017  2:15PM EST (Author)    Electronically signed by : TONY Gan; Aug 30 2017  2:56PM EST                       (Author)    Electronically signed by : KIRSTEN Coates; Aug 31 2017 12:13PM EST                       (Author)

## 2018-01-13 NOTE — PSYCH
History of Present Illness  [facility] Clinical Progress Note [facility]:   Specialized Services Documentation - Therapist must complete separate progress note for each specific clinical activity in which the client participated during the day  Case Management Note: Individual Case Management visit not provided today  Sultana Christine excused from program today due to court hearing  Current suicide risk is low  Medications not changed/added/denied  Aleksandra Rachid MSW, LSW      Active Problems    1  Alcohol abuse (305 00) (F10 10)   2  Bipolar disorder, current episode depressed, severe, without psychotic features (296 53)   (F31 4)   3  Chronic obstructive pulmonary disease (496) (J44 9)   4  Complete tear of left rotator cuff (727 61) (M75 122)   5  Complete tear of right rotator cuff (727 61) (M75 121)   6  High risk medication use (V58 69) (Z79 899)   7  Hypercholesterolemia (272 0) (E78 00)   8  Hypothyroidism (244 9) (E03 9)   9  Malignant neoplasm without specification of site (199 1) (C80 1)   10  Rotator cuff tendinitis, unspecified laterality (726 10) (M75 80)   11  Shoulder tendonitis, right (726 10) (M75 81)    Past Medical History    1  Denied: History of seizure   2  Denied: History of traumatic injury of head    Allergies    1  No Known Drug Allergies    Current Meds   1  BuPROPion HCl - 75 MG Oral Tablet; Take 1 tablet daily; Last Rx:06Jvf7158 Ordered   2  HydrOXYzine Pamoate 50 MG Oral Capsule; TAKE 1 CAPSULE Every 4 hours PRN; Last   Rx:70Kpx6247 Ordered   3  Levothyroxine Sodium 25 MCG Oral Tablet; TAKE 1 TABLET DAILY  Requested for:   21TIF0242; Last Rx:32Dnh3885 Ordered   4  Mirtazapine 15 MG Oral Tablet; TAKE 1 TABLET Bedtime; Last Rx:69Asm0157 Ordered   5  Omega 3 1000 MG Oral Capsule; Take 1 capsule twice daily; Therapy: 05Jiu7940 to (Evaluate:61Rlf7723)  Requested for: 09Igm3242; Last   Rx:33Vxa8207 Ordered   6   Valproate Sodium 250 MG/5ML Oral Solution; TAKE 10ML TWICE DAILY; Therapy: 15Mff9923 to (Last Rx:08Vth3937) Ordered    Family Psych History  Mother    1  Family history of Alcohol abuse   2  Family history of depression (V17 0) (Z81 8)  Father    3  Family history of Alcohol abuse   4  Family history of depression (V17 0) (Z81 8)  Sister    5  Family history of Alcohol abuse  Family History    6  Family history of No Significant Family History    Social History    · Alcohol abuse (305 00) (F10 10)   · Completed 8th grade   · Current Every Day Smoker (305 1)   · Daily Coffee Consumption (___ Cups/Day)   · Disabled   ·     Future Appointments    Date/Time Provider Specialty Site   09/21/2017 10:15 AM JALEEL Farrar  Psychiatry [facility]   09/22/2017 10:30 AM JALEEL Farrar   Psychiatry [facility]     Signatures   Electronically signed by : Claudetta Clarke, MSWLSW; Sep 20 2017  8:12AM EST                       (Author)

## 2018-01-14 VITALS
SYSTOLIC BLOOD PRESSURE: 116 MMHG | RESPIRATION RATE: 18 BRPM | DIASTOLIC BLOOD PRESSURE: 65 MMHG | BODY MASS INDEX: 21.34 KG/M2 | HEART RATE: 80 BPM | HEIGHT: 64 IN | TEMPERATURE: 97.9 F | WEIGHT: 125 LBS

## 2018-01-14 NOTE — PSYCH
History of Present Illness  [facility] Clinical Progress Note  [facility]:   Specialized Services Documentation - Therapist must complete separate progress note for each specific clinical activity in which the client participated during the day  [phone]) Group Psychotherapy: (10:45-11:45) Baron Samayoa participated in psychotherapy group focused on self-care  Baron Samayoa identified trying to find a place to live as a stressor today  He quietly engaged in group discussion, mostly listening to peers, but occasionally adding his opinion into discussion  Group discussed the importance of self-care, and the impact that it can have on both mental health and physical/financial stability  Some mild progress noted toward goals  Continue psychotherapy group to encourage Baron Samayoa to explore stressors and coping  Treatment Plan Problem(s): 1 1, 1 2  Luanne Daley MSW, LSW     (586) Group Psychotherapy: [id number] Baron Samayoa participated in weekly wellness group to discuss what particular area of wellness that has been worked on up to today in Program and choice of area to continue working on for recovery as targeted in treatment plan, by choice or in WRAP  Baron Samayoa was somewhat irritable in group and complained about having to arrive early to [facility] since he is being transported via [facility]  He was easily redirected back to topic and shared that he needs to find housing because he cannot stay where he is living as it is an unhealthy environment for him since he is not drinking and does not want to be with people who are drinking and using drugs  Baron Samayoa did not share what he wants to work on aside from obtaining new housing and stated that he will use his time on the weekend to go fishing  Baron Samayoa made no visible progress in group  Continue to offer weekly wellness as an strategy to offer opportunity to focus on goals and identify areas of goal achievement and need  Treatment Plan Problem(s): 1 1,1 2,1 4   Roya Dickson RN       (648) Education Therapy Goals set - Take it one day at a time    Treatment Plan Problem(s): 1 1  Education Therapy Time - 0900 - 0930 Previous goal was met  Readiness to Learning:  He is receptive to learning  There are  no barriers to learning  Learning Assessment Time - 1330 - 1400   Education completed on  illness and wellness tools  The teaching method was  verbal  Shared area of learning: Yes  JESSENIA Arteaga MT-BC     (434) Allied Therapy [phone] Arsalan Wheeler actively shared in [facility] group focused on DBT module of Emotion Regulation  He engaged in monico analysis and song-writing task  Group discussed difference between feelings and emotions, as well as the prompting events and expressions/actions of them  Group explored the emotions of sadness and happiness  He was given hand-outs on topic  Arsalan Wheeler spoke up during group and shared more than he has in past groups  Arsalan Wheeler shared that we need to reach out when we feel sad, and his family makes him happy  Some progress towards goal observed and shared  Continue AT to further encourage the understanding of wellness tool to promote recovery  JESSENIA Arteaga  Treatment Plan Problem(s): 1 1  TONY Wang     ( ) Other 13:15-call to Dr Nick Martinez office ([phone])  spoke with  who said that Dr Migue Mccarthy is booking in late November  Left information for return call from  on Monday to set up appointment for Arsalan Wheeler for first available appointment  Marifer JOY, DINOW     Case Management Note:   12:00-12:05 This CM met with Arsalan Wheeler to discuss progress in setting up resources  He stated he has seen Dr Migue Mccarthy in the past and would be ok seeing him again for medication management  This CM advised that a list of housing resources in the area would be provided to Arsalan Wheeler this afternoon so that he can also begin making phone calls to try and speed the process along  TREATMENT SESSION NUMBER: 11   Current suicide risk is low  Medications not changed/added/denied  Emmanuel Vitale MSW, LSW      Active Problems    1  Alcohol abuse (305 00) (F10 10)   2  Bipolar disorder, current episode depressed, severe, without psychotic features (296 53)   (F31 4)   3  Chronic obstructive pulmonary disease (496) (J44 9)   4  Complete tear of left rotator cuff (727 61) (M75 122)   5  Complete tear of right rotator cuff (727 61) (M75 121)   6  Hypercholesterolemia (272 0) (E78 00)   7  Hypothyroidism (244 9) (E03 9)   8  Malignant neoplasm without specification of site (199 1) (C80 1)   9  Rotator cuff tendinitis, unspecified laterality (726 10) (M75 80)   10  Shoulder tendonitis, right (726 10) (M75 81)    Past Medical History    1  Denied: History of seizure   2  Denied: History of traumatic injury of head    Allergies    1  No Known Drug Allergies    Current Meds   1  HydrOXYzine Pamoate 50 MG Oral Capsule; TAKE 1 CAPSULE Every 4 hours PRN; Therapy: (Recorded:04Exf0529) to Recorded   2  Levothyroxine Sodium 25 MCG Oral Tablet; TAKE 1 TABLET DAILY; Therapy: (Joyce Nares) to Recorded   3  Mirtazapine 15 MG Oral Tablet; TAKE 1 TABLET Bedtime; Therapy: (Joyce Nares) to Recorded   4  Omega 3 1000 MG Oral Capsule; Take 1 capsule twice daily; Therapy: 12Yff8155 to (Evaluate:28Sep2017)  Requested for: 39Lkp5120; Last   Rx:51Fta5671 Ordered   5  Valproate Sodium 250 MG/5ML Oral Solution; TAKE 10ML TWICE DAILY; Therapy: 04Mbh0516 to (Last Rx:89Omf0805) Ordered   6  Wellbutrin 75 MG TABS; Take 1 tablet daily; Therapy: (Recorded:98Qnf5360) to Recorded    Family Psych History  Mother    1  Family history of Alcohol abuse   2  Family history of depression (V17 0) (Z81 8)  Father    3  Family history of Alcohol abuse   4  Family history of depression (V17 0) (Z81 8)  Sister    5  Family history of Alcohol abuse  Family History    6   Family history of No Significant Family History    Social History    · Alcohol abuse (305 00) (F10 10)   · Completed 8th grade   · Current Every Day Smoker (305 1)   · Daily Coffee Consumption (___ Cups/Day)   · Disabled   ·     Future Appointments    Date/Time Provider Specialty Site   09/20/2017 10:30 AM JALEEL Leal  Psychiatry [facility]   09/21/2017 10:15 AM JALEEL Leal  Psychiatry [facility]   09/22/2017 10:30 AM JALEEL Leal   [facility]iatry [facility]   09/18/2017 10:30 AM Kitty Allison DO Psychiatry [facility]   09/19/2017 10:15 AM Kitty Allison DO [facility]iatry [facility]     Signatures   Electronically signed by : KIRSTEN Valladares; Sep 15 2017 12:41PM EST                       (Author)    Electronically signed by : KIRSTEN Valladares; Sep 15 2017  1:16PM EST                       (Author)    Electronically signed by : JESSENIA Moreno; Sep 15 2017  3:13PM EST                       (Author)    Electronically signed by : TONY Ryder; Sep 15 2017  3:29PM EST                       (Author)    Electronically signed by : Christian Blanco RN; Sep 15 2017  4:51PM EST                       (Author)

## 2018-01-15 NOTE — PSYCH
History of Present Illness  [facility] Clinical Progress Note [facility]:   Specialized Services Documentation - Therapist must complete separate progress note for each specific clinical activity in which the client participated during the day  [phone]) Group Psychotherapy: [id number] Uyen Wadsworth participated in wellness group to learn about anxiety and what happens physiologically when an individual experiences different levels of anxiety and different anxiety disorders as well as a panic attack  Handouts were utilized that depicted anatomically what happens during a panic attack  Cognitive and behavioral strategies, as well as medications that are prescribed for anxiety, were reviewed  Uyen Wadsworth was attentive in education group and contributed to discussion of panic attacks that he stated he has experienced as well as phobias by sharing that he developed a "great fear of dogs" to the point of not being able to stay in the same room as any dog, even smaller dogs  Uyen Wadsworth stated that he had been attacked by a Roddie Amara dog and sustained trauma to his head, scalp and back by the dog  He stated that he was left with this fear of all dogs  Peers discussed phobias that they have, including fear of heights, social anxiety, etc and Uyen Wadsworth listened attentively to different cognitive and behavioral techniques that are used to overcome phobias and great fears  Uyen Wadsworth made good progress toward goal  Continue to offer this group to present education on anxiety and treatments that can be utilized successfully to decrease individual symptoms of anxiety  Treatment Plan Problem(s): 1 1,1 2  Taco Ochoa RN     (329) Group Psychotherapy: (10:45-11:45) Uyen Wadsworth participated in psychotherapy group focused on facing fears and regrets  Uyen Wadsworth identified having to cancel his insurance due to increased monthly premium as a stressor   He actively engaged in group discussion, providing encouragement to a peer struggling with fear about surgery by talking about his own experience with surgeries, and encouraging her to push herself to try something she fears to see if she is able to do so  Group talked about the importance of focusing on what one can do now to make life circumstances better, and pushing oneself to make healthy changes  Moderate progress toward goals  Continue psychotherapy group to encourage Giovanna Villarreal to explore stressors and coping  Treatment Plan Problem(s): 1 1, 1 2  Margi Smith MSW, LSW       (191) Education Therapy Goals set - Go to AA meeting    Treatment Plan Problem(s): 1 1, 2 1  Education Therapy Time - 0900 - 0930 Previous goal was not met  Readiness to Learning:  He is receptive to learning  There are  no barriers to learning  Learning Assessment Time - 1330 - 1400   Education completed on  illness and wellness tools  The teaching method was  verbal and demonstration  Shared area of learning: Yes  JESSENIA Chase MT-BC     (181) Allied Therapy [id number] Giovanna Villarreal actively shared in [facility] group focused on communication  He engaged in improvisation and monico analysis task  Group explored the relation of different music elements to aspects of communication, and non-verbal versus verbal communication  Group discussed ways to change their communication to express their selves more clearly and positively  Giovanna Villarreal talked more in this group than he has in the past  Giovanna Villarreal shared that he is grateful for life  Some beginning progress towards treatment goal observed and shared  Continue AT to further encourage the use of wellness tool to promote recovery  JESSENIA Chase  Treatment Plan Problem(s): 1 1  TONY Sellers     ( ) Other 9:30-left message for Pito Palomares of admissions at [facility], for return call re: admission process/referral   9:40-Call to [facility]-spoke with Lena  They do not take Medicare insurance at all   If having trouble finding co-occuring/outpatient placement for Michele Gardner recommended contacting the county to see if they will cover him for treatment  9:45-Call to [facility]  Left message for return call re: Medicare coverage and if accepting new patients  NELLA Don     Case Management Note:   12:15-12:20-This CM met briefly with Ruddy Dominguez to advise of progress on resources  Will continue to work on contacting providers and housing resources, and will advise him of progress  TREATMENT SESSION NUMBER: 3   Current suicide risk is low  Medications not changed/added/denied  NELLA Don      Active Problems    1  Alcohol abuse (305 00) (F10 10)   2  Bipolar disorder, current episode depressed, severe, without psychotic features (296 53)   (F31 4)   3  Chronic obstructive pulmonary disease (496) (J44 9)   4  Complete tear of left rotator cuff (727 61) (M75 122)   5  Complete tear of right rotator cuff (727 61) (M75 121)   6  Hypercholesterolemia (272 0) (E78 00)   7  Hypothyroidism (244 9) (E03 9)   8  Malignant neoplasm without specification of site (199 1) (C80 1)   9  Rotator cuff tendinitis, unspecified laterality (726 10) (M75 80)   10  Shoulder tendonitis, right (726 10) (M75 81)    Past Medical History    1  Denied: History of seizure   2  Denied: History of traumatic injury of head    Allergies    1  No Known Drug Allergies    Current Meds   1  Divalproex Sodium 500 MG Oral Tablet Delayed Release; Take 1 tablet twice daily; Therapy: (Recorded:51Hmg4355) to Recorded   2  HydrOXYzine Pamoate 50 MG Oral Capsule; TAKE 1 CAPSULE Every 4 hours PRN; Therapy: (Orta Relic) to Recorded   3  Levothyroxine Sodium 25 MCG Oral Tablet; TAKE 1 TABLET DAILY; Therapy: (Orta Relic) to Recorded   4  Mirtazapine 15 MG Oral Tablet; TAKE 1 TABLET Bedtime; Therapy: (Orta Relic) to Recorded   5  Omega 3 1000 MG Oral Capsule; Take 1 capsule twice daily; Therapy: 58Cum3599 to (Evaluate:23Yto3235)  Requested for: 29Aug2017; Last   Rx:33Mmu4852 Ordered   6   Wellbutrin 75 MG TABS; Take 1 tablet daily; Therapy: (Recorded:41Mzz9674) to Recorded    Family Psych History  Mother    1  Family history of Alcohol abuse   2  Family history of depression (V17 0) (Z81 8)  Father    3  Family history of Alcohol abuse   4  Family history of depression (V17 0) (Z81 8)  Sister    5  Family history of Alcohol abuse  Family History    6   Family history of No Significant Family History    Social History    · Alcohol abuse (305 00) (F10 10)   · Completed 8th grade   · Current Every Day Smoker (305 1)   · Daily Coffee Consumption (___ Cups/Day)   · Disabled   ·     Future Appointments    Date/Time Provider Specialty Site   09/01/2017 10:00 AM Joanna Lundberg MD Psychiatry [facility] PARTIAL HOSPITALIZATION     Signatures   Electronically signed by : KIRSTEN Garza; Aug 31 2017 12:11PM EST                       (Author)    Electronically signed by : JESSENIA Pérez; Aug 31 2017  2:40PM EST                       (Author)    Electronically signed by : Leesa Farooq RN; Aug 31 2017  3:03PM EST                       (Author)    Electronically signed by : TONY Casarez; Aug 31 2017  3:23PM EST                       (Author)

## 2018-01-16 NOTE — PSYCH
Assessment    1  Family history of depression (V17 0) (Z81 8) : Mother, Father   2  Family history of Alcohol abuse : Mother, Father, Sister   3  Bipolar disorder, current episode depressed, severe, without psychotic features (296 53)   (F31 4)   4  Alcohol abuse (305 00) (F10 10)   5  Hypothyroidism (244 9) (E03 9)   6     7  Disabled   8  Completed 8th grade    Plan    1  Omega 3 1000 MG Oral Capsule; Take 1 capsule twice daily    [facility] Treatment Plan    [facility] Treatment Plan   AREAS OF NEEDS: Depression and alcohol abuse as evidenced by poor concentration, sleep and appetite disturbances, anhedonia, poor motivation, hopelessness, suicidal thoughts, drinking every day  Date Initiated: 8/29/2017     LONG TERM GOAL: 1 0 I will identify three signs that my depression has improved and that I feel like being more active in my life  2 0 I will identify three signs that I am having success in abstaining from drinking alcohol  Date Initiated: 8/29/2017   Target Date: 9/26/2017   Completion Date: 9/22/2017     Date Initiated: 8/29/2017    1 1 I will identify at least two coping skills that I will learn in program that help to improve my mood, and will practice at least one daily  Target Date: 9/8/2017   Completion Date: 9/22/2017   Date Initiated: 8/29/2017    1 2 I will work with my  to establish outpatient providers and to try to further my application for assisted housing  Target Date: 9/8/2017   Completion Date: 9/22/2017   Date Initiated: 8/29/2017    1 3 I will take my medications as prescribed, and will raise any questions or concerns that may arise  Target Date: 9/8/2017   Completion Date: 9/22/2017   Date Initiated: 8/29/2017    1 4 I will identify two positive supports in my life and two ways that they can help me with my recovery     Target Date: 9/8/2017   Completion Date: 9/22/2017   Date Initiated: 8/29/2017    2 1 I will identify two benefits and two consequences of continuing to drink alcohol to excess  Target Date: 2017   Completion Date:      7 DAY REVISION: Continue objectives 1 1, 1 2, 1 3 1 4 and 2 1, as they are still relevant and achievable, but as yet unmet , 1 5 I will begin to work on my WRAP to help me develop ways to support my wellness after discharge from program    Date Initiated: 2017   Revision Date: 2017   Target Date: 2017   Completion Date: 2017   Date Initiated:    Revision Date: 2017   Target Date: 2017   Completion Date: 2017             PSYCHIATRY: Medication management and education   Continue medication management and education  Continue medication management and education   Date Initiated: 2017   Revision Date: 2017; 2017   The person(s) responsible for carrying out the plan is Mikki Abreu MD , Marycarmen Beverly DO    NURSIN 1,1 2,1 3,1 4 This RN will provide daily wellness group, five days weekly, to educate Rajiv Multani on S/S of his diagnoses and medications used in treatment  1 1,1 2,1 3,1 4, 1 5,2 1 This RN will continue to provide daily wellness group to educate Rajiv Multani on his diagnoses and medications used in treatment  1 1,1 2,1 3,1 4,1 5 This RN will continue to provide daily wellness grp to educate Rajiv Multani on his diagnoses and medications  Date Initiated: 17     Revision Date: 17, 17     The person(s) responsible for carrying out the plan is Donna Barrios RN    PSYCHOLOGY: 1 1, 1 2, 1 4 Provide psychotherapy group 5 times per week to allow opportunity for Rajiv Multani to explore stressors and ways of coping  1 1, 1 2, 1 4, 1 5 Continue psychotherapy group each program day to encourage Rajiv Multani to further explore life stressors and healthier ways of coping  1 1, 1 2, 1 4, 1 5 Continue to offer psychotherapy group each day that Rajiv Multani attends program to allow him to further explore his stressors and identify healthier ways of coping with them       Date Initiated: 8/29/2017   Revision Date: 9/8/2017; 9/19/2017   The person(s) responsible for carrying out the plan is RUFINO VelizW  ALLIED THERAPY: 1 1, 1 2 Provide Ulanda Darragh AT group 4-5 times weekly to aid in understanding and use of wellness tools through engagement and discussion of meaningful tasks  Kwaku Stewartivers, MTI   1 1, 1 2 Continue to provide Ulanda Darragh AT group 4-5 times weekly to aid in understanding and use of wellness tools, and transfer of skills to home, to promote recovery through meaningful tasks  Climax Quivers, MTI   1 1,1 2,1 5 Continue to offer Ulanda Darragh AT groups 4-5 times weekly to encourage the practice of skills in structured environment and discussion of successes and challenges while transferring the skills outside of program  SMM     Date Initiated: 8/29/17   Revision Date: 9/8/17; 9/19/17   The person(s) responsible for carrying out the plan is TONY Lamb  SUBSTANCE ABUSE: 2 1 Provide co-occurring disorder group 2 times per week to encourage Ranjit Rodriguez to explore the consequences of continuing to engage in alcohol abuse behaviors  2 1 Continue co-occurring disorder group twice a week to allow Ranjit Rodriguez to continue exploring the consequences of alcohol abuse  2 1 Continue offering co-occurring disorder group one to two times per week to encourage Ranjit Rodriguez to further identify the consequences of alcohol abuse  Date Initiated: 8/29/2017   Revision Date: 9/8/2017; 9/19/2017     The person(s) responsible for carrying out the plan is Jacquelyn Sheppard LCSW  CASE MANAGEMENT: 1 0, 2 0 This  will meet with Ranjit Rodriguez 3-4 times weekly to assess treatment progress, discharge planning, connection to community supports and UR as indicated  1 0, 2 0 Continue meeting with Ranjit Rodriguez 3-4 times per week to continue working on discharge planning, including housing resources and outpatient providers, and UR as needed    1 0, 2 0 Continue to meet with Ranjit Rodriguez at least 2-3 times per week to assess discharge readiness, confirm aftercare plans and encourage follow up on outreach to community resources such as housing assistance  Date Initiated: 8/29/2017   Revision Date: 9/8/2017; 9/19/2017   The person(s) responsible for carrying out the plan is RUFINO Trejo  DISCHARGE CRITERIA: Identify three signs of improvement and complete relapse prevention plan  DISCHARGE PLAN: Refer to PCP, outpatient psychiatrist and therapist for co-occurring treatment  Encourage attendance at [facility]  Estimated Length of Stay: 10 treatment days   Strengths: has had long periods of sobriety in past; motivated to seek treatment   Limitations: limited supports; long history of alcohol and drug abuse   Diagnosis and Treatment Plan explained to patient, patient relates understanding diagnosis and is agreeable to Treatment Plan             CLIENT COMMENTS / Please share your thoughts, feelings, need and/or experiences regarding your treatment plan: _____________________________________________________________________________________________________________________________________________________________________________________________________________________________________________________________________________________________________________________ Date/Time: ______________         Patient Signature: _________________________________ Date/Time: ______________          Signatures   Electronically signed by : JALEEL Soto ; Aug 29 2017 10:08AM EST                       (Author)    Electronically signed by : JESSENIA John; Aug 29 2017 10:29AM EST                       (Author)    Electronically signed by : Mu Gfof RN; Aug 29 2017  1:13PM EST                       (Author)    Electronically signed by : KIRSTEN Trejo; Aug 29 2017  1:48PM EST                       (Author)    Electronically signed by : TONY Perez; Aug 29 2017  3:10PM EST                       (Author)    Electronically signed by : JESSENIA Johnson; Sep  8 2017  9:49AM EST                       (Author)    Electronically signed by : KIRSTEN Meredith; Sep  8 2017  2:17PM EST                       (Author)    Electronically signed by : TONY Benavides; Sep  8 2017  2:38PM EST                       (Author)    Electronically signed by : Erenest Baumgarten, RN; Sep  8 2017  3:01PM EST                       (Author)    Electronically signed by : Denzel Zuñiga DO; Sep  8 2017  5:00PM EST                       (Author)    Electronically signed by : TONY Benavides; Sep 19 2017  2:12PM EST                       (Author)    Electronically signed by : KIRSTEN Meredith; Sep 19 2017  4:19PM EST                       (Author)    Electronically signed by : Denzel Zuñiga DO; Sep 19 2017  4:41PM EST                       (Author)    Electronically signed by : Erenest Baumgarten, RN; Sep 20 2017  1:51PM EST                       (Author)    Electronically signed by : KIRSTEN Meredith; Sep 22 2017  2:33PM EST                       (Author)

## 2018-01-16 NOTE — PSYCH
Message  Message Free Text Note Form: 11:32-This CM called Hetal Todd in response to message left on voicemail that he needs medications refilled and does not see his new psychiatrist until the end of November  Tanja Solorio that he has a refill on his Depakote that will get him through to early December  He needs bupropion and mirtazapine  Tanja Solorio that Dr Niurka Navarrete denied the renewal request as he is not under his care  Will discuss with Dr Marek Lucas to see if she is willing to send one refill to pharmacy, and if she is not, this CM will call Hetal Todd back  Active Problems    1  Alcohol abuse (305 00) (F10 10)   2  Bipolar disorder, current episode depressed, severe, without psychotic features (296 53)   (F31 4)   3  Chronic obstructive pulmonary disease (496) (J44 9)   4  Complete tear of left rotator cuff (727 61) (M75 122)   5  Complete tear of right rotator cuff (727 61) (M75 121)   6  High risk medication use (V58 69) (Z79 899)   7  Hypercholesterolemia (272 0) (E78 00)   8  Hypothyroidism (244 9) (E03 9)   9  Malignant neoplasm without specification of site (199 1) (C80 1)   10  Rotator cuff tendinitis, unspecified laterality (726 10) (M75 80)   11  Shoulder tendonitis, right (726 10) (M75 81)    Current Meds   1  BuPROPion HCl - 75 MG Oral Tablet; Take 1 tablet daily  Requested for: 88AAQ8736; Last   Rx:69Npj9741; Status: ACTIVE - Renewal Denied Ordered   2  HydrOXYzine Pamoate 50 MG Oral Capsule; TAKE 1 CAPSULE Every 4 hours PRN; Last   Rx:60Wzo0489 Ordered   3  Levothyroxine Sodium 25 MCG Oral Tablet; TAKE 1 TABLET DAILY  Requested for:   43KKE4323; Last Rx:28Xmh9729 Ordered   4  Mirtazapine 15 MG Oral Tablet; TAKE 1 TABLET Bedtime  Requested for: 34LKX9474; Last   Rx:51Dml8151; Status: ACTIVE - Renewal Denied Ordered   5  Omega 3 1000 MG Oral Capsule; Take 1 capsule twice daily; Therapy: 25Dsw4117 to (Evaluate:19Oct2017)  Requested for: 05Ncf8416; Last   Rx:58Gwd5803 Ordered   6   Valproate Sodium 250 MG/5ML Oral Solution; TAKE 10ML TWICE DAILY; Therapy: 65Ihq7438 to (Evaluate:47Jhg4125)  Requested for: 06QEV9616; Last   Rx:12Oct2017 Ordered    Allergies    1   No Known Drug Allergies    Signatures   Electronically signed by : KIRSTEN Whitten; Nov 2 2017 11:35AM EST                       (Author)

## 2018-01-16 NOTE — PSYCH
History of Present Illness  [facility] Clinical Progress Note [facility]:   Specialized Services Documentation - Therapist must complete separate progress note for each specific clinical activity in which the client participated during the day  ([phone]) Group Psychotherapy: (9:30-10:30) Karsten Reed participated in psychotherapy group focused on coping with anxiety  Karsten Reed identified not being able to get his license this weekend and having to pay a ticket as a stressor  He was otherwise quiet throughout the hour, but did appear attentive to peers through good eye contact with each speaker  Mild progress toward goals today  Continue psychotherapy group to encourage Karsten Reed to explore stressors and coping  Treatment Plan Problem(s): 1 1, 1 2  Pinky Patino MSW, LSW     (976) Group Psychotherapy: [id number] Karsten Reed participated in wellness group focused on learning about using cognitive and behavioral strategies as an adjunct to medication therapy for anxiety  Karsten Reed was attentive and understood how behavioral therapy worked based on his experiences with AA and twelve step support groups  He understood how it is necessary to practice these strategies, in addition to taking medications, for best success in reducing his symptoms  Karsten Reed made good progress toward goals  Continue to offer education on how cognitive and behavioral coping mechanisms, added to individual medications, can assist in recovery from clinical anxiety and depression  Treatment Plan Problem(s): 1 1,1 2,1 3,2 1  Christian Blanco RN       (086) Education Therapy Goals set - work on Matter and Form    Treatment Plan Problem(s): 1 1  Education Therapy Time - 0900 - 0930 Previous goal was met  Readiness to Learning:  He is receptive to learning  There are  no barriers to learning  Learning Assessment Time - 1330 - 1400   Education completed on  illness and wellness tools  The teaching method was  verbal and demonstration  Shared area of learning: Yes   Brandon Luna Xochitl Fragoso [facility]     (993) Allied Therapy [phone] Darnell Murguia actively shared in [facility] group focused on relaxation techniques and DBT skill of Radical Acceptance  He engaged in therapist-led relaxation exercises and monico analysis  Group explored diaphragmatic breathing, progressive muscle relaxation, breath counting, and visualization  Group also explored ways to radically accept reality and let go  He was given hand-out on topic  Darnell Ferrerchester shared that he could take away âaccepting realityâ from the group  Some good progress towards goal observed and shared  Continue AT to further encourage the use of wellness tool to promote recovery  JESSENIA Mendez  Treatment Plan Problem(s): 1 1  Joao Miller [facility]     ( ) Other 16:02-Call to [facility], currently not taking Medicare patients  16:03-Call to [employer], currganeshly not open to Medicare as they are at their cap   16:04-call to [facility], left message for Kelechi Hansen, , requesting call back re: intake/wait list  Gertrude JOY, NELLA     Case Management Note:   12:20-12:25 This CM met with Darnell Ferrerchester to discuss progress  He stated that he is doing well with abstaining from alcohol and has been attending [facility] meetings  He would like to get out of his cousin's house by the end of the month if possible  This CM advised that housing resources as well as outpatient providers are being pursued and will advise when more info is gathered  TREATMENT SESSION NUMBER: 5   Current suicide risk is low  Medications not changed/added/denied  Gertrude JOY, LSW      Active Problems    1  Alcohol abuse (305 00) (F10 10)   2  Bipolar disorder, current episode depressed, severe, without psychotic features (296 53)   (F31 4)   3  Chronic obstructive pulmonary disease (496) (J44 9)   4  Complete tear of left rotator cuff (727 61) (M75 122)   5  Complete tear of right rotator cuff (727 61) (M75 121)   6  Hypercholesterolemia (272 0) (E78 00)   7   Hypothyroidism (244 9) (E03 9)   8  Malignant neoplasm without specification of site (199 1) (C80 1)   9  Rotator cuff tendinitis, unspecified laterality (726 10) (M75 80)   10  Shoulder tendonitis, right (726 10) (M75 81)    Past Medical History    1  Denied: History of seizure   2  Denied: History of traumatic injury of head    Allergies    1  No Known Drug Allergies    Current Meds   1  Divalproex Sodium 500 MG Oral Tablet Delayed Release; Take 1 tablet twice daily; Therapy: (Recorded:25Hzu2512) to Recorded   2  HydrOXYzine Pamoate 50 MG Oral Capsule; TAKE 1 CAPSULE Every 4 hours PRN; Therapy: (Patrici Deck) to Recorded   3  Levothyroxine Sodium 25 MCG Oral Tablet; TAKE 1 TABLET DAILY; Therapy: (Patrici Deck) to Recorded   4  Mirtazapine 15 MG Oral Tablet; TAKE 1 TABLET Bedtime; Therapy: (Patrici Deck) to Recorded   5  Omega 3 1000 MG Oral Capsule; Take 1 capsule twice daily; Therapy: 29Hfr2781 to (Evaluate:17Pqk6416)  Requested for: 18Sfq6697; Last   Rx:71Opr3523 Ordered   6  Wellbutrin 75 MG TABS; Take 1 tablet daily; Therapy: (Recorded:96Ucj0602) to Recorded    Family Psych History  Mother    1  Family history of Alcohol abuse   2  Family history of depression (V17 0) (Z81 8)  Father    3  Family history of Alcohol abuse   4  Family history of depression (V17 0) (Z81 8)  Sister    5  Family history of Alcohol abuse  Family History    6   Family history of No Significant Family History    Social History    · Alcohol abuse (305 00) (F10 10)   · Completed 8th grade   · Current Every Day Smoker (305 1)   · Daily Coffee Consumption (___ Cups/Day)   · Disabled   ·     Future Appointments    Date/Time Provider Specialty Site   09/06/2017 10:00 AM DO Jaqueline Andrade [facility]   09/07/2017 10:00 AM DO Jaqueline nAdrade [facility]   09/08/2017 10:00 AM DO Jaqueline Andrade [facility]     Signatures   Electronically signed by : JESSENIA Garsia; Sep  5 2017  2:07PM EST                       (Author)    Electronically signed by : KIRSTEN Leary; Sep  5 2017  2:26PM EST                       (Author)    Electronically signed by : TONY Robles; Sep  5 2017  2:53PM EST                       (Author)    Electronically signed by : KIRSTEN Leary; Sep  5 2017  4:07PM EST                       (Author)    Electronically signed by : Kaylene Segovia RN; Sep  8 2017 11:43AM EST                       (Author)

## 2018-01-16 NOTE — PSYCH
Risk Assessment    The following ratings are based on my observation of this patient over the last initial intake  Risk of Harm to Self:   Demographic risk factors include , alaskan, or native [place], lowest socioeconomic class, male and  status  Historical Risk Factors include: chronic psychiatric problems and substance abuse or dependence  Recent Specific Risk Factors include: experienced persistent ideation, sense of hopelessness/helplessness, unable to visualize a realistic positive future, substance abuse and chronic pain or health problems  These risk factors presented within the last month  Risk of Harm to Others:   Demographic Risk Factors include: male  Recent Specific Risk Factors include: abusing substances and concomitant mood or thought disorder  Access to Weapons: The patient has access to the following weapons: denied  Based on the above information, the client presents the following risk of harm to self or others: low  The following interventions are recommended: no intervention changes  Active Problems    1  Alcohol abuse (305 00) (F10 10)   2  Bipolar disorder, current episode depressed, severe, without psychotic features (296 53)   (F31 4)   3  Chronic obstructive pulmonary disease (496) (J44 9)   4  Complete tear of left rotator cuff (727 61) (M75 122)   5  Complete tear of right rotator cuff (727 61) (M75 121)   6  Hypercholesterolemia (272 0) (E78 00)   7  Hypothyroidism (244 9) (E03 9)   8  Malignant neoplasm without specification of site (199 1) (C80 1)   9  Rotator cuff tendinitis, unspecified laterality (726 10) (M75 80)   10  Shoulder tendonitis, right (726 10) (M75 81)    Past Medical History    1  Denied: History of seizure   2  Denied: History of traumatic injury of head    Future Appointments    Date/Time Provider Specialty Site   08/30/2017 10:00 AM JALEEL Rubio   Psychiatry [facility]   08/31/2017 10:00 AM Davida Groves MD Psychiatry [facility]   09/01/2017 10:00 AM Davida Groves MD Psychiatry [facility]     Signatures   Electronically signed by : KIRSTEN Egan; Aug 29 2017  1:38PM EST                       (Author)

## 2018-01-16 NOTE — DISCHARGE SUMMARY
Discharge Summary  [facility] Discharge Summary Kira Learn:   Admission Date: 8/29/2017  Patient was referred by [address]  Discharge Date: 9/22/2017  This was a routine discharge  Diagnosis: Axis I: Bipolar Disorder  Treating Physician: Anna Rodriguez MD; Kamran Burton DO  Treatment Complications: none  Presenting Problem: Keturah Harman was referred to [facility] after an inpatient stay at [facility] for severe depression with suicidal thoughts, evidenced by poor sleep and appetite, poor concentration, feeling hopeless, isolating socially, and excessive alcohol use  Course of treatment includes group counseling, pharmacotherapy, individual case management, allied therapy, psychoeducation, psychiatric evaluation and dual diagnosis counseling  Treatment Progress: Keturah Harman attended [facility] treatment days  His treatment plan goals included medication management, skill building to help him manage his symptoms of anxiety and depression better, and maintaining sobriety  Keturah Harman was quiet during his stay in program, mostly due to his being self-conscious of his inability to speak clearly due to throat surgery  He was attentive to peers and participated in groups as well as he could  He worked with this  to attempt to find alternative housing for him, as he is currently living with his cousin in an environment that does not support his ongoing sobriety  Throughout the course of his stay, Keturah Harman did seem to improve emotionally, with his mood presenting as brighter and less anxious throughout his stay  He regularly attended AA and reported remaining sober throughout the program  He has been reaching out to [place] [address] friends and supports to help him get out of his house more  He denied SI, HI and psychosis upon discharge  Aftercare recommendations include  Referred to Dr Omid Lowery in [place] for outpatient medication management    Denied referral to individual therapy at this point in time due to transportation issues  Encouraged regular attendance at [place] [address] to focus on sobriety  Discharge Medications include: See below  Discharge [address]  Discharge Instructions [facility]:   Disposition: Home/Family  Address: [place] Nair, [address] [place] [address]  Diagnosis: Bipolar Disorder, current episode depressed  Allergies (Drug/Food): none known  Activity: you may not return to work until n/a, but you may drive  Diet: regular  Smoking Cessation: The best thing you can do to improve your health is to stop using tobacco    Diagnostic/Laboratory Orders: Depakote level done  Follow-up appointments/Referrals:   Dr Roland Boards ([phone]) November 2017  Continue attending [facility]: [facility] [phone]  Crisis Intervention (Emergency) [place]: [place]: [phone]  National Suicide Crisis Hotline: 1-547.835.8369  I, the undersigned, have received and understand the above instructions  Patient/Rep Signature: __________________________________ Date/Time: ______________         Physician Signature: ____________________________________ Date/Time: ______________      Signature: ________________________________ Date/Time: ______________      Active Problems    1  Alcohol abuse (305 00) (F10 10)   2  Bipolar disorder, current episode depressed, severe, without psychotic features (296 53)   (F31 4)   3  Chronic obstructive pulmonary disease (496) (J44 9)   4  Complete tear of left rotator cuff (727 61) (M75 122)   5  Complete tear of right rotator cuff (727 61) (M75 121)   6  High risk medication use (V58 69) (Z79 899)   7  Hypercholesterolemia (272 0) (E78 00)   8  Hypothyroidism (244 9) (E03 9)   9  Malignant neoplasm without specification of site (199 1) (C80 1)   10  Rotator cuff tendinitis, unspecified laterality (726 10) (M75 80)   11   Shoulder tendonitis, right (726 10) (M75 81)    Past Medical History 1  Denied: History of seizure   2  Denied: History of traumatic injury of head    Social History    · Alcohol abuse (305 00) (F10 10)   · Completed 8th grade   · Current Every Day Smoker (305 1)   · Daily Coffee Consumption (___ Cups/Day)   · Disabled   ·     Current Meds   1  BuPROPion HCl - 75 MG Oral Tablet; Take 1 tablet daily; Last Rx:78Vzo7052 Ordered   2  HydrOXYzine Pamoate 50 MG Oral Capsule; TAKE 1 CAPSULE Every 4 hours PRN; Last   Rx:37Knk9311 Ordered   3  Levothyroxine Sodium 25 MCG Oral Tablet; TAKE 1 TABLET DAILY  Requested for:   68MSM9744; Last Rx:79Hfv7857 Ordered   4  Mirtazapine 15 MG Oral Tablet; TAKE 1 TABLET Bedtime; Last Rx:88Hxw0022 Ordered   5  Omega 3 1000 MG Oral Capsule; Take 1 capsule twice daily; Therapy: 89Tpo3125 to (Evaluate:19Oct2017)  Requested for: 90Aps4497; Last   Rx:53Bzl1403 Ordered   6  Valproate Sodium 250 MG/5ML Oral Solution; TAKE 10ML TWICE DAILY; Therapy: 16Vuz5156 to (Last Rx:10Mso4665) Ordered    Allergies    1   No Known Drug Allergies    Signatures   Electronically signed by : KIRSTEN Torres; Sep 22 2017  4:28PM EST                       (Author)    Electronically signed by : JALEEL Broussard ; Sep 25 2017  8:32AM EST                       (Author)

## 2018-01-17 NOTE — PSYCH
History of Present Illness  [facility] Clinical Progress Note [facility]:   Specialized Services Documentation - Therapist must complete separate progress note for each specific clinical activity in which the client participated during the day  ([phone]) Group Psychotherapy: (9:30-10:30) Darnell Murguia participated in psychotherapy group focused on expressing emotions  Darnell Murguia identified having to go to court on Wednesday as a stressor  He was otherwise quiet throughout group discussion, appearing attentive to peers through good eye contact and nodding his head at appropriate times  Minimal progress noted toward goals today  Continue psychotherapy group to encourage Darnell Murguia to explore stressors and healthy ways of coping  Gertrude Gonzalez MSW, LSW     (108) Group Psychotherapy: 8716-8056 Darnell Murguia participated in wellness group focused on recognizing and weighing the costs versus the benefits of coping strategies and behaviors being utilized and identifying which strategies may need to be changed as the cost is unhealthy and outweighing any positive benefits  Darnell Murguia was attentive and actively shared in discussion of healthy versus unhealthy coping skills and the cost versus benefit  He was active in learning from others (role modeling) to learn and practice better coping strategies as well as denial  Darnell Murguia made mild progress toward toward goals  Continue to offer this group to provide education in identifying healthy versus unhealthy coping strategies and how to change thoughts and behaviors for better mental and physical health and well-being  Treatment Plan Problem(s): 1 1,1 2,1 4  Nasra Ward RN       (762) Education Therapy Goals set - go to [place] [address] meeting, get more sleep    Treatment Plan Problem(s): 1 1  Education Therapy Time - 0900 - 0930 Previous goal was met  Readiness to Learning:  He is receptive to learning  There are  no barriers to learning    Learning Assessment Time - 1330 - 1400   Education completed on illness and wellness tools  The teaching method was  verbal and demonstration  Shared area of learning: Yes  [facility] MTCHARLIE  TONY Casarez     (655) Allied Therapy 1505-4204 Alonsosridhar Gomez moderately shared in [facility] group focused on DBT skill [employer]  He engaged in task of observing, describing, and participating in instrument playing  Group explored differences between emotional mind, reasonable mind, and wise mind  Group also explored how to nonjudgmentally, one-mindfully, and effectively engage in wise mind  He was given hand-outs on topic, including DBT skill diary card for the week  Alonso Millerqi remained quiet during group  Inconsistent progress towards goal noted  Continue AT to further encourage the use of wellness tool to promote recovery  JESSENIA Pérez  Treatment Plan Problem(s): 1 1  TONY Casarez       Case Management Note:   12:25-12:30) This CM met with Alonso Millerqi to discuss progress  Alonso Gomez stated that he feels he has benefitted from being in program, and reports his mood being improved  Discussed housing resources, as provided on Friday, and he stated he plans to make phone calls this afternoon  This CM advised that Dr Wing Dumas in [place] has appointments available in November, so he will have outpatient medication management  He has also been regularly attending [place] [address] meetings and has remained sober since before going into the hospital    TREATMENT SESSION NUMBER: 12   Current suicide risk is low  Medications not changed/added/denied  Versa Anisha MSW, LSW      Active Problems    1  Alcohol abuse (305 00) (F10 10)   2  Bipolar disorder, current episode depressed, severe, without psychotic features (296 53)   (F31 4)   3  Chronic obstructive pulmonary disease (496) (J44 9)   4  Complete tear of left rotator cuff (727 61) (M75 122)   5  Complete tear of right rotator cuff (727 61) (M75 121)   6  Hypercholesterolemia (272 0) (E78 00)   7  Hypothyroidism (244 9) (E03 9)   8   Malignant neoplasm without specification of site (199 1) (C80 1)   9  Rotator cuff tendinitis, unspecified laterality (726 10) (M75 80)   10  Shoulder tendonitis, right (726 10) (M75 81)    Past Medical History    1  Denied: History of seizure   2  Denied: History of traumatic injury of head    Allergies    1  No Known Drug Allergies    Current Meds   1  HydrOXYzine Pamoate 50 MG Oral Capsule; TAKE 1 CAPSULE Every 4 hours PRN; Therapy: (Recorded:28Aec7555) to Recorded   2  Levothyroxine Sodium 25 MCG Oral Tablet; TAKE 1 TABLET DAILY; Therapy: (Grace Ray) to Recorded   3  Mirtazapine 15 MG Oral Tablet; TAKE 1 TABLET Bedtime; Therapy: (Grace Ray) to Recorded   4  Omega 3 1000 MG Oral Capsule; Take 1 capsule twice daily; Therapy: 36Syu3871 to (Evaluate:28Sep2017)  Requested for: 73Loi9203; Last   Rx:41Ifv0779 Ordered   5  Valproate Sodium 250 MG/5ML Oral Solution; TAKE 10ML TWICE DAILY; Therapy: 93Eak5577 to (Last Rx:07Sep2017) Ordered   6  Wellbutrin 75 MG TABS; Take 1 tablet daily; Therapy: (Recorded:47Sno1359) to Recorded    Family Psych History  Mother    1  Family history of Alcohol abuse   2  Family history of depression (V17 0) (Z81 8)  Father    3  Family history of Alcohol abuse   4  Family history of depression (V17 0) (Z81 8)  Sister    5  Family history of Alcohol abuse  Family History    6  Family history of No Significant Family History    Social History    · Alcohol abuse (305 00) (F10 10)   · Completed 8th grade   · Current Every Day Smoker (305 1)   · Daily Coffee Consumption (___ Cups/Day)   · Disabled   ·     Future Appointments    Date/Time Provider Specialty Site   09/20/2017 10:30 AM JALEEL Pathak  Psychiatry [facility]   09/21/2017 10:15 AM JALEEL Pathak  Psychiatry [facility]   09/22/2017 10:30 AM JALEEL Pathak   Psychiatry [facility]   09/19/2017 10:15 AM Deshawn Mitchell Dominik Ernandez, DO Psychiatry [facility]     Signatures   Electronically signed by : KIRSTEN Norman; Sep 18 2017  2:03PM EST                       (Author)    Electronically signed by : Noe Mcdonald RN; Sep 18 2017  2:58PM EST                       (Author)    Electronically signed by : JESSENIA Guzmán; Sep 18 2017  3:15PM EST                       (Author)    Electronically signed by : TONY Farah; Sep 18 2017  3:20PM EST                       (Author)

## 2018-01-18 NOTE — PSYCH
History of Present Illness  [facility] Clinical Progress Note [facility]:   Specialized Services Documentation - Therapist must complete separate progress note for each specific clinical activity in which the client participated during the day  (182) Group Psychotherapy: [id number]  Sultana Christine participated in psychotherapy group that focused on overcoming anxiety  He identified his court hearing tomorrow as a stressor  Sultana Christine minimally participated in group discussion, but did appear attentive to peers, with good eye contact throughout the hour  Minimal progress noted towards goals  Continue psychotherapy group to encourage Sultana Christine to explore his life stressors and healthy coping skills  Treatment Plan Problem(s): 1 1, 1 2  Aleksandra Ring MSW, LSW     (049) Group Psychotherapy: 4926-0809 Sultana Christine participated in wellness group that focused on providing education on strategies that have been proven effective in managing anxiety and worry  Each participant was asked to identify their own symptoms of high anxiety and to target one strategy, from handout, they feel they can learn and practice  Sultana Christine was attentive but did not speak in group today despite encouragement to share in discussion  Sultana Christine made minimal progress toward goals  Continue to offer group to provide education and practice in handling anxiety with healthy cognitive and behavioral strategies as an adjunct, where indicated, to medication  Treatment Plan Problem(s): 1 1,1 2  Cristian Ribeiro RN       (248) Education Therapy Goals set - Go to AA    Treatment Plan Problem(s): 1 1  Education Therapy Time - 0900 - 0930 Previous goal was met  Readiness to Learning:  He is receptive to learning  There are  no barriers to learning  Learning Assessment Time - 1330 - 1400   Education completed on  illness and wellness tools  The teaching method was  verbal and demonstration  Shared area of learning: Yes  JESSENIA Bolden MT-BC     (172) Allied Therapy 1586-2520 Suraj Carney moderately shared in [facility] group focused on self-care  He engaged in task of monico analysis and identifying ways of and barriers to self-care  Group explored idea of self-care versus subjugation  Group also explored PLEASE as the different aspects of physical self-care, from the DBT module of Emotion Regulation  Group was introduced to an object meditation  He was given hand-outs on topic  Suraj Carney remained quiet during group, only sharing when prompted  Suraj Carney shared that fishing is something he enjoys and balancing sleep is something he can do for self-care tonight  Minimal progress towards goal observed  Continue AT to further encourage the use of wellness tool to promote recovery  JESSENIA Smyth  Treatment Plan Problem(s): 1 1  Ember Angulo MT-BC       Case Management Note:   12:25-12:30 This CM met with Suraj Carney to advise that doctor refilled most of his medications today, except for liquid Depakote, as it is full  Dr Janki Murguia requested that Suraj Carney get labs done, and lab order was given to Suraj Carney  He will go to [facility] near program in the morning later this week to get labs done  Discussed refill of levothyroxine and that he should get it from PCP  Suraj Carney stated that the doctor in inpatient unit filled it for him, and that he is not established with a PCP  Reminded him of provider list given to him at beginning of program, and he will call to see if he can make appointment with Dr Junior Kwan  This CM will task Dr Janki Murguia for one refill of thyroid med, but advised Suraj Carney that he will not provide continued refills and that he needs to see PCP as soon as possible  TREATMENT SESSION NUMBER: 13   Current suicide risk is low  Medications not changed/added/denied  Diogo Barker MSW, LSW      Active Problems    1  Alcohol abuse (305 00) (F10 10)   2  Bipolar disorder, current episode depressed, severe, without psychotic features (296 53)   (F31 4)   3   Chronic obstructive pulmonary disease (496) (J44 9)   4  Complete tear of left rotator cuff (727 61) (M75 122)   5  Complete tear of right rotator cuff (727 61) (M75 121)   6  Hypercholesterolemia (272 0) (E78 00)   7  Hypothyroidism (244 9) (E03 9)   8  Malignant neoplasm without specification of site (199 1) (C80 1)   9  Rotator cuff tendinitis, unspecified laterality (726 10) (M75 80)   10  Shoulder tendonitis, right (726 10) (M75 81)    Past Medical History    1  Denied: History of seizure   2  Denied: History of traumatic injury of head    Allergies    1  No Known Drug Allergies    Current Meds   1  HydrOXYzine Pamoate 50 MG Oral Capsule; TAKE 1 CAPSULE Every 4 hours PRN; Therapy: (Recorded:[id number]) to Recorded   2  Levothyroxine Sodium 25 MCG Oral Tablet; TAKE 1 TABLET DAILY; Therapy: (Anisa Precise) to Recorded   3  Mirtazapine 15 MG Oral Tablet; TAKE 1 TABLET Bedtime; Therapy: (Anisa Precise) to Recorded   4  Omega 3 1000 MG Oral Capsule; Take 1 capsule twice daily; Therapy: 08Ywi9000 to (Evaluate:13Ydc9912)  Requested for: 88Udt4536; Last   Rx:[id number] Ordered   5  Valproate Sodium 250 MG/5ML Oral Solution; TAKE 10ML TWICE DAILY; Therapy: 90Ohi9407 to (Last Rx:[id number]) Ordered   6  Wellbutrin 75 MG TABS; Take 1 tablet daily; Therapy: (Recorded:25Ehn5337) to Recorded    Family Psych History  Mother    1  Family history of Alcohol abuse   2  Family history of depression (V17 0) (Z81 8)  Father    3  Family history of Alcohol abuse   4  Family history of depression (V17 0) (Z81 8)  Sister    5  Family history of Alcohol abuse  Family History    6  Family history of No Significant Family History    Social History    · Alcohol abuse (305 00) (F10 10)   · Completed 8th grade   · Current Every Day Smoker (305 1)   · Daily Coffee Consumption (___ Cups/Day)   · Disabled   ·     Future Appointments    Date/Time Provider Specialty Site   09/21/2017 10:15 AM JALEEL Zafar   [facility]   09/22/2017 10:30 AM Last Matos M D   Psychiatry [facility] PARTIAL HOSPITALIZATION     Signatures   Electronically signed by : KIRSTEN Vang; Sep 19 2017 12:45PM EST                       (Author)    Electronically signed by : JESSENIA Bruno; Sep 19 2017  2:13PM EST                       (Author)    Electronically signed by : TONY De La Torre; Sep 19 2017  2:19PM EST                       (Author)    Electronically signed by : Jacquelyn Motley RN; Sep 19 2017  4:17PM EST                       (Author)

## 2018-03-07 NOTE — PSYCH
History of Present Illness    Presenting Problems: Stressors: PATIENT WAS ADMITTED FOR DEPRESSION AND SUICIDAL IDEATION NO PLAN  Referral Source: [facility]  He is not employed  He is a smoker  Symptoms: suicidal ideation, plan: NO PLAN, no self abusive behaviors, no homicidal thoughts, no history of violence toward others, depressed mood, anxiety, no psychosis, no medication noncompliance, sleep disturbances, change in appetite, no agitation and no hypomania  Provisional Diagnosis: Axis I: BI POLAR MIXED SEVERE W/O PSYCHOSIS  Substance Abuse: alcohol  Psychiatric Treatment History: UNKNOWN WHERE, Current Psychiatrist: NONE and Therapist: NONE   The patient does not require ambulatory assistance  Legal Issues: The patient does not have legal issues  ACCEPTED  Appointment Date: 08/29/17        Signatures   Electronically signed by : Balta Busby, ; Aug 28 2017 11:18AM EST                       (Author)

## 2018-05-07 ENCOUNTER — TRANSCRIBE ORDERS (OUTPATIENT)
Dept: LAB | Facility: CLINIC | Age: 58
End: 2018-05-07

## 2018-05-07 ENCOUNTER — APPOINTMENT (OUTPATIENT)
Dept: LAB | Facility: CLINIC | Age: 58
End: 2018-05-07
Payer: COMMERCIAL

## 2018-05-07 DIAGNOSIS — E03.9 MYXEDEMA HEART DISEASE: ICD-10-CM

## 2018-05-07 DIAGNOSIS — I51.9 MYXEDEMA HEART DISEASE: ICD-10-CM

## 2018-05-07 DIAGNOSIS — E78.1 PURE HYPERGLYCERIDEMIA: Primary | ICD-10-CM

## 2018-05-07 DIAGNOSIS — E78.1 PURE HYPERGLYCERIDEMIA: ICD-10-CM

## 2018-05-07 LAB
ALBUMIN SERPL BCP-MCNC: 3.4 G/DL (ref 3.5–5)
ALP SERPL-CCNC: 168 U/L (ref 46–116)
ALT SERPL W P-5'-P-CCNC: 36 U/L (ref 12–78)
ANION GAP SERPL CALCULATED.3IONS-SCNC: 10 MMOL/L (ref 4–13)
AST SERPL W P-5'-P-CCNC: 23 U/L (ref 5–45)
BASOPHILS # BLD AUTO: 0.02 THOUSANDS/ΜL (ref 0–0.1)
BASOPHILS NFR BLD AUTO: 0 % (ref 0–1)
BILIRUB SERPL-MCNC: 0.5 MG/DL (ref 0.2–1)
BUN SERPL-MCNC: 8 MG/DL (ref 5–25)
CALCIUM SERPL-MCNC: 8.4 MG/DL (ref 8.3–10.1)
CHLORIDE SERPL-SCNC: 105 MMOL/L (ref 100–108)
CHOLEST SERPL-MCNC: 176 MG/DL (ref 50–200)
CO2 SERPL-SCNC: 25 MMOL/L (ref 21–32)
CREAT SERPL-MCNC: 0.54 MG/DL (ref 0.6–1.3)
EOSINOPHIL # BLD AUTO: 0.04 THOUSAND/ΜL (ref 0–0.61)
EOSINOPHIL NFR BLD AUTO: 1 % (ref 0–6)
ERYTHROCYTE [DISTWIDTH] IN BLOOD BY AUTOMATED COUNT: 12.9 % (ref 11.6–15.1)
GFR SERPL CREATININE-BSD FRML MDRD: 116 ML/MIN/1.73SQ M
GLUCOSE P FAST SERPL-MCNC: 93 MG/DL (ref 65–99)
HCT VFR BLD AUTO: 44.6 % (ref 36.5–49.3)
HDLC SERPL-MCNC: 39 MG/DL (ref 40–60)
HGB BLD-MCNC: 15.7 G/DL (ref 12–17)
LDLC SERPL CALC-MCNC: 110 MG/DL (ref 0–100)
LYMPHOCYTES # BLD AUTO: 0.73 THOUSANDS/ΜL (ref 0.6–4.47)
LYMPHOCYTES NFR BLD AUTO: 14 % (ref 14–44)
MCH RBC QN AUTO: 32.2 PG (ref 26.8–34.3)
MCHC RBC AUTO-ENTMCNC: 35.2 G/DL (ref 31.4–37.4)
MCV RBC AUTO: 92 FL (ref 82–98)
MONOCYTES # BLD AUTO: 0.6 THOUSAND/ΜL (ref 0.17–1.22)
MONOCYTES NFR BLD AUTO: 11 % (ref 4–12)
NEUTROPHILS # BLD AUTO: 3.99 THOUSANDS/ΜL (ref 1.85–7.62)
NEUTS SEG NFR BLD AUTO: 74 % (ref 43–75)
NONHDLC SERPL-MCNC: 137 MG/DL
PLATELET # BLD AUTO: 219 THOUSANDS/UL (ref 149–390)
PMV BLD AUTO: 9.4 FL (ref 8.9–12.7)
POTASSIUM SERPL-SCNC: 4.1 MMOL/L (ref 3.5–5.3)
PROT SERPL-MCNC: 7 G/DL (ref 6.4–8.2)
RBC # BLD AUTO: 4.87 MILLION/UL (ref 3.88–5.62)
SODIUM SERPL-SCNC: 140 MMOL/L (ref 136–145)
TRIGL SERPL-MCNC: 137 MG/DL
TSH SERPL DL<=0.05 MIU/L-ACNC: 1.6 UIU/ML (ref 0.36–3.74)
WBC # BLD AUTO: 5.38 THOUSAND/UL (ref 4.31–10.16)

## 2018-05-07 PROCEDURE — 80053 COMPREHEN METABOLIC PANEL: CPT

## 2018-05-07 PROCEDURE — 80061 LIPID PANEL: CPT

## 2018-05-07 PROCEDURE — 85025 COMPLETE CBC W/AUTO DIFF WBC: CPT

## 2018-05-07 PROCEDURE — 84443 ASSAY THYROID STIM HORMONE: CPT

## 2018-05-07 PROCEDURE — 36415 COLL VENOUS BLD VENIPUNCTURE: CPT

## 2018-05-31 ENCOUNTER — TRANSCRIBE ORDERS (OUTPATIENT)
Dept: LAB | Facility: CLINIC | Age: 58
End: 2018-05-31

## 2018-05-31 ENCOUNTER — APPOINTMENT (OUTPATIENT)
Dept: LAB | Facility: CLINIC | Age: 58
End: 2018-05-31
Payer: COMMERCIAL

## 2018-05-31 DIAGNOSIS — R73.09 ELEVATED GLUCOSE LEVEL: ICD-10-CM

## 2018-05-31 DIAGNOSIS — R94.5 NONSPECIFIC ABNORMAL RESULTS OF LIVER FUNCTION STUDY: ICD-10-CM

## 2018-05-31 DIAGNOSIS — E78.2 MIXED HYPERLIPIDEMIA: ICD-10-CM

## 2018-05-31 DIAGNOSIS — R73.09 ELEVATED GLUCOSE LEVEL: Primary | ICD-10-CM

## 2018-05-31 DIAGNOSIS — I51.9 MYXEDEMA HEART DISEASE: ICD-10-CM

## 2018-05-31 DIAGNOSIS — E03.9 MYXEDEMA HEART DISEASE: ICD-10-CM

## 2018-05-31 LAB
ALBUMIN SERPL BCP-MCNC: 3.2 G/DL (ref 3.5–5)
ALP SERPL-CCNC: 107 U/L (ref 46–116)
ALT SERPL W P-5'-P-CCNC: 23 U/L (ref 12–78)
ANION GAP SERPL CALCULATED.3IONS-SCNC: 11 MMOL/L (ref 4–13)
AST SERPL W P-5'-P-CCNC: 20 U/L (ref 5–45)
BASOPHILS # BLD AUTO: 0.04 THOUSANDS/ΜL (ref 0–0.1)
BASOPHILS NFR BLD AUTO: 1 % (ref 0–1)
BILIRUB SERPL-MCNC: 0.5 MG/DL (ref 0.2–1)
BUN SERPL-MCNC: 13 MG/DL (ref 5–25)
CALCIUM SERPL-MCNC: 8.2 MG/DL (ref 8.3–10.1)
CHLORIDE SERPL-SCNC: 105 MMOL/L (ref 100–108)
CHOLEST SERPL-MCNC: 194 MG/DL (ref 50–200)
CO2 SERPL-SCNC: 26 MMOL/L (ref 21–32)
CREAT SERPL-MCNC: 0.61 MG/DL (ref 0.6–1.3)
EOSINOPHIL # BLD AUTO: 0.09 THOUSAND/ΜL (ref 0–0.61)
EOSINOPHIL NFR BLD AUTO: 2 % (ref 0–6)
ERYTHROCYTE [DISTWIDTH] IN BLOOD BY AUTOMATED COUNT: 12.9 % (ref 11.6–15.1)
GFR SERPL CREATININE-BSD FRML MDRD: 110 ML/MIN/1.73SQ M
GLUCOSE P FAST SERPL-MCNC: 105 MG/DL (ref 65–99)
HCT VFR BLD AUTO: 41.1 % (ref 36.5–49.3)
HDLC SERPL-MCNC: 51 MG/DL (ref 40–60)
HGB BLD-MCNC: 14.2 G/DL (ref 12–17)
LDLC SERPL CALC-MCNC: 120 MG/DL (ref 0–100)
LYMPHOCYTES # BLD AUTO: 1.03 THOUSANDS/ΜL (ref 0.6–4.47)
LYMPHOCYTES NFR BLD AUTO: 25 % (ref 14–44)
MCH RBC QN AUTO: 31.8 PG (ref 26.8–34.3)
MCHC RBC AUTO-ENTMCNC: 34.5 G/DL (ref 31.4–37.4)
MCV RBC AUTO: 92 FL (ref 82–98)
MONOCYTES # BLD AUTO: 0.65 THOUSAND/ΜL (ref 0.17–1.22)
MONOCYTES NFR BLD AUTO: 15 % (ref 4–12)
NEUTROPHILS # BLD AUTO: 2.4 THOUSANDS/ΜL (ref 1.85–7.62)
NEUTS SEG NFR BLD AUTO: 57 % (ref 43–75)
NONHDLC SERPL-MCNC: 143 MG/DL
PLATELET # BLD AUTO: 204 THOUSANDS/UL (ref 149–390)
PMV BLD AUTO: 8.9 FL (ref 8.9–12.7)
POTASSIUM SERPL-SCNC: 3.8 MMOL/L (ref 3.5–5.3)
PROT SERPL-MCNC: 6.7 G/DL (ref 6.4–8.2)
RBC # BLD AUTO: 4.46 MILLION/UL (ref 3.88–5.62)
SODIUM SERPL-SCNC: 142 MMOL/L (ref 136–145)
TRIGL SERPL-MCNC: 113 MG/DL
TSH SERPL DL<=0.05 MIU/L-ACNC: 2.21 UIU/ML (ref 0.36–3.74)
WBC # BLD AUTO: 4.21 THOUSAND/UL (ref 4.31–10.16)

## 2018-05-31 PROCEDURE — 87340 HEPATITIS B SURFACE AG IA: CPT

## 2018-05-31 PROCEDURE — 86706 HEP B SURFACE ANTIBODY: CPT

## 2018-05-31 PROCEDURE — 86803 HEPATITIS C AB TEST: CPT

## 2018-05-31 PROCEDURE — 86708 HEPATITIS A ANTIBODY: CPT

## 2018-05-31 PROCEDURE — 36415 COLL VENOUS BLD VENIPUNCTURE: CPT

## 2018-05-31 PROCEDURE — 80061 LIPID PANEL: CPT

## 2018-05-31 PROCEDURE — 85025 COMPLETE CBC W/AUTO DIFF WBC: CPT

## 2018-05-31 PROCEDURE — 84443 ASSAY THYROID STIM HORMONE: CPT

## 2018-05-31 PROCEDURE — 83036 HEMOGLOBIN GLYCOSYLATED A1C: CPT

## 2018-05-31 PROCEDURE — 80053 COMPREHEN METABOLIC PANEL: CPT

## 2018-06-01 LAB
EST. AVERAGE GLUCOSE BLD GHB EST-MCNC: 105 MG/DL
HAV AB SER QL IA: NORMAL
HBA1C MFR BLD: 5.3 % (ref 4.2–6.3)
HBV SURFACE AB SER-ACNC: 5.57 MIU/ML
HBV SURFACE AG SER QL: NORMAL
HCV AB SER QL: NORMAL